# Patient Record
Sex: FEMALE | Race: WHITE | NOT HISPANIC OR LATINO | Employment: FULL TIME | URBAN - NONMETROPOLITAN AREA
[De-identification: names, ages, dates, MRNs, and addresses within clinical notes are randomized per-mention and may not be internally consistent; named-entity substitution may affect disease eponyms.]

---
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
## 2020-01-06 ENCOUNTER — HOSPITAL ENCOUNTER (EMERGENCY)
Facility: CLINIC | Age: 20
Discharge: HOME | End: 2020-01-06
Attending: EMERGENCY MEDICINE | Admitting: EMERGENCY MEDICINE
Payer: COMMERCIAL

## 2020-01-06 VITALS
WEIGHT: 120 LBS | BODY MASS INDEX: 20.49 KG/M2 | HEIGHT: 64 IN | RESPIRATION RATE: 16 BRPM | TEMPERATURE: 97.8 F | HEART RATE: 81 BPM | DIASTOLIC BLOOD PRESSURE: 70 MMHG | SYSTOLIC BLOOD PRESSURE: 145 MMHG | OXYGEN SATURATION: 99 %

## 2020-01-06 DIAGNOSIS — S00.412A EAR CANAL ABRASION, LEFT, INITIAL ENCOUNTER: ICD-10-CM

## 2020-01-06 PROCEDURE — 99284 EMERGENCY DEPT VISIT MOD MDM: CPT | Mod: Z6 | Performed by: EMERGENCY MEDICINE

## 2020-01-06 PROCEDURE — 99283 EMERGENCY DEPT VISIT LOW MDM: CPT | Performed by: EMERGENCY MEDICINE

## 2020-01-06 RX ORDER — NEOMYCIN SULFATE, POLYMYXIN B SULFATE AND HYDROCORTISONE 10; 3.5; 1 MG/ML; MG/ML; [USP'U]/ML
4 SUSPENSION/ DROPS AURICULAR (OTIC) 4 TIMES DAILY
Qty: 6 ML | Refills: 0 | Status: SHIPPED | OUTPATIENT
Start: 2020-01-06 | End: 2020-01-13

## 2020-01-06 RX ORDER — TRANEXAMIC ACID 100 MG/ML
500 INJECTION, SOLUTION INTRAVENOUS ONCE
Status: DISCONTINUED | OUTPATIENT
Start: 2020-01-06 | End: 2020-01-06 | Stop reason: HOSPADM

## 2020-01-06 ASSESSMENT — MIFFLIN-ST. JEOR: SCORE: 1304.32

## 2020-01-06 NOTE — ED AVS SNAPSHOT
[facility] Emergency Department  [address] [place] [address]  Phone:  [phone]  Fax:  [phone]                                    Vibha Caceres   MRN: [id number]    Department:  [facility] Emergency Department   Date of Visit:  1/6/2020           After Visit Summary Signature Page    I have received my discharge instructions, and my questions have been answered. I have discussed any challenges I see with this plan with the nurse or doctor.    ..........................................................................................................................................  Patient/Patient Representative Signature      ..........................................................................................................................................  Patient Representative Print Name and Relationship to Patient    ..................................................               ................................................  Date                                   Time    ..........................................................................................................................................  Reviewed by Signature/Title    ...................................................              ..............................................  Date                                               Time          22EPIC Rev 08/18

## 2020-01-07 DIAGNOSIS — J45.40 MODERATE PERSISTENT ASTHMA, UNCOMPLICATED: ICD-10-CM

## 2020-01-07 NOTE — TELEPHONE ENCOUNTER
Requested Prescriptions   Pending Prescriptions Disp Refills     predniSONE (DELTASONE) 20 MG tablet [Pharmacy Med Name: PREDNISONE 20MG TABS] 10 tablet 3     Sig: TAKE ONE TABLET BY MOUTH TWICE A DAY       There is no refill protocol information for this order        Last Written Prescription Date:  8/15/2018  Last Fill Quantity: 10 tablets,  # refills: 3   Last office visit: 5/31/2019 with prescribing provider:     Future Office Visit:    Moderate persistent asthma, uncomplicated [J45.40]     Routing refill request to provider for review/approval because:  Drug not on the [facility] refill protocol     Nelsy Magana RN

## 2020-01-07 NOTE — DISCHARGE INSTRUCTIONS
Avoid Q-tips in your ears as that can cause further injury.  You have middle ear effusions and may benefit from your prednisone.  The middle ears are not infected so antibiotics would not benefit.  Regarding your ear canal if you develop colored drainage, increased pain, fever you should begin antibiotic drops and follow-up to have your ear rechecked.

## 2020-01-07 NOTE — ED PROVIDER NOTES
History     Chief Complaint   Patient presents with     Otalgia     HPI  Vibha Caceres is a 19 year old female who presents with bright red blood coming from her left ear.  Earlier this evening patient had irritation at the ear so tried to relieve this with a Q-tip.  She was unsuccessful so had her mother attempt this also.  Unfortunately while she was doing this she developed bright red blood from the left ear canal.  This has persisted.  Denies any pain with this.  Denies any right ear pain or drainage.  No history of mastoid issue.  She is not had fever or chills.  Denies any congestion or sore throat.  Is not on blood thinners.  Has seasonal allergies and has been sneezing more.  Prior to manipulating the ear with a Q-tip she had no discharge or drainage.    Allergies:  Allergies   Allergen Reactions     Amoxicillin-Pot Clavulanate Hives     Cat Hair [Cats]      Dog Hair [Dogs]      Dust Mites      Milk [Lactose]      Milk - is able to eat cheese and ice cream and baked goods with milk, just not able to drink milk Per Dr. Carballo, Pediatric Pulmonary and infectious disease at [facility]     Mold      Trees      Amoxicillin Rash     Cefprozil Rash       Problem List:    Patient Active Problem List    Diagnosis Date Noted     Seasonal allergic rhinitis 07/13/2012     Priority: Medium     Scoliosis 07/22/2011     Priority: Medium     Asthma, mild intermittent, well-controlled      Priority: Medium        Past Medical History:    Past Medical History:   Diagnosis Date     Acute sinusitis, unspecified      Asthma, moderate persistent      Pneumonia, organism unspecified(486) 12/2005     Unspecified asthma, with exacerbation 09/20/2007       Past Surgical History:    History reviewed. No pertinent surgical history.    Family History:    Family History   Problem Relation Age of Onset     Gynecology Mother         ovarian hyperplasia     Asthma Father      Allergies Father      Prostate Cancer Maternal Grandfather       "Thyroid Disease Maternal Grandfather      Thyroid Disease Paternal Grandmother         hypo       Social History:  Marital Status:  Single [1]  Social History     Tobacco Use     Smoking status: Never Smoker     Smokeless tobacco: Never Used   Substance Use Topics     Alcohol use: No     Drug use: No        Medications:    neomycin-polymyxin-hydrocortisone (CORTISPORIN) 3.5-09551-3 otic suspension  albuterol (VENTOLIN HFA) 108 (90 Base) MCG/ACT Inhaler  Calcium Carb-Cholecalciferol (CALCIUM 500 + D) 500-400 MG-UNIT TABS  cetirizine (ZYRTEC) 10 MG tablet  fluticasone (FLOVENT HFA) 220 MCG/ACT Inhaler  montelukast (SINGULAIR) 10 MG tablet  predniSONE (DELTASONE) 20 MG tablet  triamcinolone (KENALOG) 0.1 % cream          Review of Systems all other systems are reviewed and are negative.    Physical Exam   BP: (!) 145/70  Pulse: 81  Resp: 16  Height: 162.6 cm (5' 4\")  Weight: 54.4 kg (120 lb)  SpO2: 99 %      Physical Exam general alert cooperative female who does not toxic or ill.  HEENT shows no facial swelling or asymmetry.  The right canal is normal.  The TM shows an effusion but no infection.  On the left side she has bright red blood oozing from the inferior aspect of the external canal.  The TM again shows an effusion but no infection.  The TM is intact.  She has no mastoid tenderness or redness.  Nasal congestion but no oral lesions.  Neck is supple.    ED Course        Procedures               Critical Care time:  none               No results found for this or any previous visit (from the past 24 hour(s)).    Medications   tranexamic acid (CYKLOKAPRON) spray 500 mg (has no administration in time range)     TXA is placed in the canal on a pledget.  On recheck the bleeding had stopped.  The area of abrasion was then noted.  No suturable lesion was noted.  Assessments & Plan (with Medical Decision Making)   Vibha Caceres is a 19 year old female who presents with bright red blood coming from her left ear.  " Earlier this evening patient had irritation at the ear so tried to relieve this with a Q-tip.  She was unsuccessful so had her mother attempt this also.  Unfortunately while she was doing this she developed bright red blood from the left ear canal.  This has persisted.  Denies any pain with this.  Denies any right ear pain or drainage.  No history of mastoid issue.  She is not had fever or chills.  Denies any congestion or sore throat.  Is not on blood thinners.  Has seasonal allergies and has been sneezing more.  Prior to manipulating the ear with a Q-tip she had no discharge or drainage.  On exam patient had bilateral ear effusions but no middle ear infection.  She had bright red blood oozing from the inferior external aspect of the canal.  No mastoid tenderness or redness.  TXA was placed on a pledget with adequate hemostasis.  Information on ear abrasion is provided.  Recommended no further use of Q-tips.  Does have bilateral ear effusions but no infection of the middle ear.  She has prednisone for her allergies and can take this for that.  I did give her a prescription for eardrops if she develops ear drainage, fever, or increased pain.  However if this occurs have asked her to see a provider at school if this occurs.  Reasons to return to emergency room were discussed.  I have reviewed the nursing notes.    I have reviewed the findings, diagnosis, plan and need for follow up with the patient.       New Prescriptions    NEOMYCIN-POLYMYXIN-HYDROCORTISONE (CORTISPORIN) 3.5-78593-4 OTIC SUSPENSION    Place 4 drops Into the left ear 4 times daily for 7 days       Final diagnoses:   Ear canal abrasion, left, initial encounter       1/6/2020   [facility] EMERGENCY DEPARTMENT     Nghia Hurley MD  01/06/20 1919       Nghia Hurley MD  01/06/20 1925

## 2020-01-08 RX ORDER — PREDNISONE 20 MG/1
TABLET ORAL
Qty: 10 TABLET | Refills: 3 | Status: SHIPPED | OUTPATIENT
Start: 2020-01-08 | End: 2021-06-15

## 2020-06-22 DIAGNOSIS — J45.40 MODERATE PERSISTENT ASTHMA, UNCOMPLICATED: ICD-10-CM

## 2020-06-23 NOTE — TELEPHONE ENCOUNTER
Routing to schedulers to set up virtual/phone appointment for patient for asthma recheck.  Please also ask patient how much medication she has left and schedule appropriately.   If patient needs a refill before their appointment, please route to Rn for completion of refill.  Upon routing message, write WHEN appointment is scheduled and if they have enough medication to last to appointment.  Thank you!

## 2020-06-23 NOTE — TELEPHONE ENCOUNTER
Patient states she doesn't need visit, just a refill. Please call Patient back to explain if a visit is really necessary.

## 2020-06-24 NOTE — TELEPHONE ENCOUNTER
Please do ACT over the phone (she has a paper copy).  Then I will send over refills.     Electronically signed by Cassie Santiago CNP.

## 2020-06-26 RX ORDER — MONTELUKAST SODIUM 10 MG/1
TABLET ORAL
Qty: 90 TABLET | Refills: 3 | Status: SHIPPED | OUTPATIENT
Start: 2020-06-26 | End: 2021-06-14

## 2020-06-26 RX ORDER — ALBUTEROL SULFATE 90 UG/1
AEROSOL, METERED RESPIRATORY (INHALATION)
Qty: 36 G | Refills: 4 | Status: SHIPPED | OUTPATIENT
Start: 2020-06-26 | End: 2020-07-13

## 2020-06-27 ASSESSMENT — ASTHMA QUESTIONNAIRES: ACT_TOTALSCORE: 24

## 2020-07-03 DIAGNOSIS — L30.9 ECZEMA, UNSPECIFIED TYPE: ICD-10-CM

## 2020-07-03 RX ORDER — TRIAMCINOLONE ACETONIDE 1 MG/G
CREAM TOPICAL
Qty: 80 G | Refills: 0 | Status: CANCELLED | OUTPATIENT
Start: 2020-07-03

## 2020-07-06 NOTE — TELEPHONE ENCOUNTER
Routing to schedulers to set up F2F appointment for patient for physical.  Please also ask patient how much medication she has left and schedule appropriately.   If patient needs a refill before their appointment, please route to triage for completion of refill.  Upon routing message, write WHEN appointment is scheduled and if they have enough medication to last to appointment.  Thank you!    Last Written Prescription Date:  6/25/18  Last Fill Quantity: 80 g,  # refills: 0   Last office visit: 5/31/2019 with prescribing provider:     Future Office Visit:      RN to fill to get to appointment if refill needed.  Natasha Whaley, BSN, RN

## 2020-07-07 NOTE — TELEPHONE ENCOUNTER
Vibha is scheduled for 7/13 for a phone visit. Said no to a physical. Will have enough medication until this visit.

## 2020-07-13 ENCOUNTER — VIRTUAL VISIT (OUTPATIENT)
Dept: FAMILY MEDICINE | Facility: CLINIC | Age: 20
End: 2020-07-13
Payer: COMMERCIAL

## 2020-07-13 DIAGNOSIS — J45.40 MODERATE PERSISTENT ASTHMA, UNCOMPLICATED: Primary | ICD-10-CM

## 2020-07-13 DIAGNOSIS — L30.9 ECZEMA, UNSPECIFIED TYPE: ICD-10-CM

## 2020-07-13 PROCEDURE — 99214 OFFICE O/P EST MOD 30 MIN: CPT | Mod: TEL | Performed by: NURSE PRACTITIONER

## 2020-07-13 RX ORDER — ALBUTEROL SULFATE 90 UG/1
AEROSOL, METERED RESPIRATORY (INHALATION)
Qty: 36 G | Refills: 4 | Status: SHIPPED | OUTPATIENT
Start: 2020-07-13 | End: 2021-06-14

## 2020-07-13 RX ORDER — TRIAMCINOLONE ACETONIDE 1 MG/G
CREAM TOPICAL
Qty: 80 G | Refills: 0 | Status: SHIPPED | OUTPATIENT
Start: 2020-07-13 | End: 2021-07-09

## 2020-07-13 NOTE — PATIENT INSTRUCTIONS
Follow up for a physical when you can.  Okay to defer for a little while if you want.     Medications were refilled.

## 2020-07-13 NOTE — PROGRESS NOTES
"Vibha Caceres is a 20 year old female who is being evaluated via a billable telephone visit.      The patient has been notified of following:     \"This telephone visit will be conducted via a call between you and your physician/provider. We have found that certain health care needs can be provided without the need for a physical exam.  This service lets us provide the care you need with a short phone conversation.  If a prescription is necessary we can send it directly to your pharmacy.  If lab work is needed we can place an order for that and you can then stop by our lab to have the test done at a later time.    Telephone visits are billed at different rates depending on your insurance coverage. During this emergency period, for some insurers they may be billed the same as an in-person visit.  Please reach out to your insurance provider with any questions.    If during the course of the call the physician/provider feels a telephone visit is not appropriate, you will not be charged for this service.\"    Patient has given verbal consent for Telephone visit?  Yes    What phone number would you like to be contacted at? [phone]    How would you like to obtain your AVS? Mail a copy    Subjective     Vibha Caceres is a 20 year old female who presents via phone visit today for the following health issues:    HPI  Medication Followup of triamcinolone    Taking Medication as prescribed: yes    Side Effects:  None    Medication Helping Symptoms:  yes      Taking for eczema.  Works well.  Also needs a refill of her Albuterol inhaler.  Using rarely.  Symptoms during allergy season.  Not using her Flovent or Singulair consistently.  Does not need a refill of those.          Reviewed and updated as needed this visit by Provider         Review of Systems   Constitutional, HEENT, cardiovascular, pulmonary, gi and gu systems are negative, except as otherwise noted.       Objective   Reported vitals:  There were no vitals " taken for this visit.   healthy, alert and no distress  PSYCH: Alert and oriented times 3; coherent speech, normal   rate and volume, able to articulate logical thoughts, able   to abstract reason, no tangential thoughts, no hallucinations   or delusions  Her affect is normal  RESP: No cough, no audible wheezing, able to talk in full sentences  Remainder of exam unable to be completed due to telephone visits    Diagnostic Test Results:  none         Assessment/Plan:  1. Moderate persistent asthma, uncomplicated  Chronic, controlled.  No change in treatment plan.   - albuterol (VENTOLIN HFA) 108 (90 Base) MCG/ACT inhaler; INHALE 2 PUFFS INTO THE LUNGS EVERY 6 HOURS AS NEEDED  Dispense: 36 g; Refill: 4    2. Eczema, unspecified type  Chronic, controlled.  No change in treatment plan.   - triamcinolone (KENALOG) 0.1 % external cream; Apply sparingly to affected area three times daily as needed  Dispense: 80 g; Refill: 0    No follow-ups on file.      Phone call duration:  8 minutes    FRANDY Connelly CNP

## 2020-10-22 DIAGNOSIS — J45.40 MODERATE PERSISTENT ASTHMA, UNCOMPLICATED: ICD-10-CM

## 2020-10-23 RX ORDER — FLUTICASONE PROPIONATE 220 UG/1
2 AEROSOL, METERED RESPIRATORY (INHALATION) 2 TIMES DAILY
Qty: 1 INHALER | Refills: 2 | Status: SHIPPED | OUTPATIENT
Start: 2020-10-23 | End: 2021-06-14

## 2021-06-14 ENCOUNTER — TELEPHONE (OUTPATIENT)
Dept: FAMILY MEDICINE | Facility: CLINIC | Age: 21
End: 2021-06-14

## 2021-07-09 ENCOUNTER — OFFICE VISIT (OUTPATIENT)
Dept: FAMILY MEDICINE | Facility: CLINIC | Age: 21
End: 2021-07-09
Payer: COMMERCIAL

## 2021-07-09 VITALS
RESPIRATION RATE: 10 BRPM | WEIGHT: 139.4 LBS | BODY MASS INDEX: 23.93 KG/M2 | SYSTOLIC BLOOD PRESSURE: 98 MMHG | OXYGEN SATURATION: 99 % | DIASTOLIC BLOOD PRESSURE: 60 MMHG | HEART RATE: 99 BPM | TEMPERATURE: 97.8 F

## 2021-07-09 DIAGNOSIS — L30.9 ECZEMA, UNSPECIFIED TYPE: ICD-10-CM

## 2021-07-09 DIAGNOSIS — J30.1 SEASONAL ALLERGIC RHINITIS DUE TO POLLEN: ICD-10-CM

## 2021-07-09 DIAGNOSIS — J45.40 MODERATE PERSISTENT ASTHMA, UNCOMPLICATED: Primary | ICD-10-CM

## 2021-07-09 PROCEDURE — 99214 OFFICE O/P EST MOD 30 MIN: CPT | Performed by: NURSE PRACTITIONER

## 2021-07-09 RX ORDER — TRIAMCINOLONE ACETONIDE 1 MG/G
CREAM TOPICAL
Qty: 80 G | Refills: 0 | Status: SHIPPED | OUTPATIENT
Start: 2021-07-09

## 2021-07-09 NOTE — LETTER
My Asthma Action Plan    Name: Vibha Caceres   YOB: 2000  Date: 7/9/2021   My doctor: Maria Eugenia Wagner NP   My clinic: [facility]        My Rescue Medicine:   Albuterol inhaler (Proair/Ventolin/Proventil HFA)  2-4 puffs EVERY 4 HOURS as needed. Use a spacer if recommended by your provider.   My Asthma Severity:   Intermittent / Exercise Induced  Know your asthma triggers: upper respiratory infections and pollens  smoke  dust mites  pollens  animal dander  mold  humidity  strong odors and fumes  exercise or sports          GREEN ZONE   Good Control    I feel good    No cough or wheeze    Can work, sleep and play without asthma symptoms       Take your asthma control medicine every day.     1. If exercise triggers your asthma, take your rescue medication    15 minutes before exercise or sports, and    During exercise if you have asthma symptoms  2. Spacer to use with inhaler: If you have a spacer, make sure to use it with your inhaler             YELLOW ZONE Getting Worse  I have ANY of these:    I do not feel good    Cough or wheeze    Chest feels tight    Wake up at night   1. Keep taking your Green Zone medications  2. Start taking your rescue medicine:    every 20 minutes for up to 1 hour. Then every 4 hours for 24-48 hours.  3. If you stay in the Yellow Zone for more than 12-24 hours, contact your doctor.  4. If you do not return to the Green Zone in 12-24 hours or you get worse, start taking your oral steroid medicine if prescribed by your provider.           RED ZONE Medical Alert - Get Help  I have ANY of these:    I feel awful    Medicine is not helping    Breathing getting harder    Trouble walking or talking    Nose opens wide to breathe       1. Take your rescue medicine NOW  2. If your provider has prescribed an oral steroid medicine, start taking it NOW  3. Call your doctor NOW  4. If you are still in the Red Zone after 20 minutes and you have not reached your  doctor:    Take your rescue medicine again and    Call 911 or go to the emergency room right away    See your regular doctor within 2 weeks of an Emergency Room or Urgent Care visit for follow-up treatment.          Annual Reminders:  Meet with Asthma Educator,  Flu Shot in the Fall, consider Pneumonia Vaccination for patients with asthma (aged 19 and older).    Pharmacy: [facility] [address]    Electronically signed by Maria Eugenia Wagner NP   Date: 07/09/21                    Asthma Triggers  How To Control Things That Make Your Asthma Worse    Triggers are things that make your asthma worse.  Look at the list below to help you find your triggers and   what you can do about them. You can help prevent asthma flare-ups by staying away from your triggers.      Trigger                                                          What you can do   Cigarette Smoke  Tobacco smoke can make asthma worse. Do not allow smoking in your home, car or around you.  Be sure no one smokes at a child s day care or school.  If you smoke, ask your health care provider for ways to help you quit.  Ask family members to quit too.  Ask your health care provider for a referral to Quit Plan to help you quit smoking, or call 4-687-182-PLAN.     Colds, Flu, Bronchitis  These are common triggers of asthma. Wash your hands often.  Don t touch your eyes, nose or mouth.  Get a flu shot every year.     Dust Mites  These are tiny bugs that live in cloth or carpet. They are too small to see. Wash sheets and blankets in hot water every week.   Encase pillows and mattress in dust mite proof covers.  Avoid having carpet if you can. If you have carpet, vacuum weekly.   Use a dust mask and HEPA vacuum.   Pollen and Outdoor Mold  Some people are allergic to trees, grass, or weed pollen, or molds. Try to keep your windows closed.  Limit time out doors when pollen count is high.   Ask you health care provider about taking  medicine during allergy season.     Animal Dander  Some people are allergic to skin flakes, urine or saliva from pets with fur or feathers. Keep pets with fur or feathers out of your home.    If you can t keep the pet outdoors, then keep the pet out of your bedroom.  Keep the bedroom door closed.  Keep pets off cloth furniture and away from stuffed toys.     Mice, Rats, and Cockroaches  Some people are allergic to the waste from these pests.   Cover food and garbage.  Clean up spills and food crumbs.  Store grease in the refrigerator.   Keep food out of the bedroom.   Indoor Mold  This can be a trigger if your home has high moisture. Fix leaking faucets, pipes, or other sources of water.   Clean moldy surfaces.  Dehumidify basement if it is damp and smelly.   Smoke, Strong Odors, and Sprays  These can reduce air quality. Stay away from strong odors and sprays, such as perfume, powder, hair spray, paints, smoke incense, paint, cleaning products, candles and new carpet.   Exercise or Sports  Some people with asthma have this trigger. Be active!  Ask your doctor about taking medicine before sports or exercise to prevent symptoms.    Warm up for 5-10 minutes before and after sports or exercise.     Other Triggers of Asthma  Cold air:  Cover your nose and mouth with a scarf.  Sometimes laughing or crying can be a trigger.  Some medicines and food can trigger asthma.

## 2021-07-09 NOTE — PROGRESS NOTES
Assessment & Plan       Moderate persistent asthma, uncomplicated  Uses albuterol inhaler as directed. States that asthma is worse in the spring/summer time frame with more pollen in the air. Denies any current chest pains or difficulty breathing. Uses singulair and ICS with colds/ flares.  Non smoker. ACT is 21, AAP completed.   - Asthma action plan updated  - continue to use inhaler as prescribed    Eczema, unspecified type  Currently controlled with use of cream. Medication refilled at this time. Denies worsening of eczema.   - triamcinolone (KENALOG) 0.1 % external cream; Apply sparingly to affected area three times daily as needed    Seasonal allergic rhinitis due to pollen  PRN use of montelukast tablets and fluticasone inhaler with increased allergy symptoms. Well controlled. Denies any current shortness of breath or difficulty breathing   - continue medications as directed, when needed for symptomatic management     Health maintenance:  Addressed questions regarding annual physical, blood work and pap smear. Denies any concerns regarding STD/STI infections. Education provided regarding cholesterol screenings and annual visit for next summer.     35 minutes spent on the date of the encounter doing chart review, review of test results, patient visit and documentation        FUTURE APPOINTMENTS:      Return in about 3 months (around 10/9/2021) for Physical Exam.    Tracy Robles, Student Nurse Practitioner    Maria Eugenia Wagner NP  [facility]    Jaylen Dunne is a 21 year old who presents for the following health issues   Requesting refill of triamcinolone cream for eczema rash. Currently home for the summer from college. Denies any current health complaints or concerns. Addressed questions regarding annual physical, blood work and pap smear. Denies any concerns regarding STD/STI infections. Education provided regarding cholesterol screenings and annual visit for next summer.      HPI     Asthma Follow-Up    Was ACT completed today?    Yes    ACT Total Scores 7/9/2021   ACT TOTAL SCORE -   ASTHMA ER VISITS -   ASTHMA HOSPITALIZATIONS -   ACT TOTAL SCORE (Goal Greater than or Equal to 20) 21   In the past 12 months, how many times did you visit the emergency room for your asthma without being admitted to the hospital? 0   In the past 12 months, how many times were you hospitalized overnight because of your asthma? 0          How many days per week do you miss taking your asthma controller medication?  I do not have an asthma controller medication    Please describe any recent triggers for your asthma: smoke, dust mites, pollens, animal dander, mold, humidity, strong odors and fumes and exercise or sports    Have you had any Emergency Room Visits, Urgent Care Visits, or Hospital Admissions since your last office visit?  No      How many servings of fruits and vegetables do you eat daily?  0-1    On average, how many sweetened beverages do you drink each day (Examples: soda, juice, sweet tea, etc.  Do NOT count diet or artificially sweetened beverages)?   0    How many days per week do you exercise enough to make your heart beat faster? 3 or less    How many minutes a day do you exercise enough to make your heart beat faster? 20 - 29    How many days per week do you miss taking your medication? 0    Medication Followup of Triamcinolone     Taking Medication as prescribed: yes    Side Effects:  None    Medication Helping Symptoms:  yes         Review of Systems   CONSTITUTIONAL:NEGATIVE for fever, chills, change in weight  INTEGUMENTARY/SKIN: NEGATIVE for worrisome rashes, moles or lesions  EYES: NEGATIVE for vision changes or irritation  ENT/MOUTH: NEGATIVE for ear, mouth and throat problems  RESP:NEGATIVE for significant cough or SOB  CV: NEGATIVE for chest pain, palpitations or peripheral edema  : normal menstrual cycles, negative for, dysuria and urgency  PSYCHIATRIC: NEGATIVE for  changes in mood or affect      Objective    BP 98/60   Pulse 99   Temp 97.8  F (36.6  C)   Resp 10   Wt 63.2 kg (139 lb 6.4 oz)   SpO2 99%   BMI 23.93 kg/m    Body mass index is 23.93 kg/m .  Physical Exam   GENERAL: healthy, alert and no distress  EYES: Eyes grossly normal to inspection, PERRL and conjunctivae and sclerae normal  HENT: ear canals and TM's normal, nose and mouth without ulcers or lesions  NECK: no adenopathy, no asymmetry, masses, or scars and thyroid normal to palpation  RESP: lungs clear to auscultation - no rales, rhonchi or wheezes  CV: regular rate and rhythm, normal S1 S2, no S3 or S4, no murmur, click or rub, no peripheral edema and peripheral pulses strong  ABDOMEN: soft, nontender, no hepatosplenomegaly, no masses and bowel sounds normal  MS: no gross musculoskeletal defects noted, no edema  PSYCH: mentation appears normal, affect normal/bright  LYMPH: no cervical, supraclavicular, axillary, or inguinal adenopathy

## 2021-07-10 ASSESSMENT — ASTHMA QUESTIONNAIRES: ACT_TOTALSCORE: 21

## 2022-08-03 NOTE — PROGRESS NOTES
SUBJECTIVE:   CC: Vibha Caceres is an 18 year old woman who presents for preventive health visit.     Physical   Annual:     Getting at least 3 servings of Calcium per day::  NO    Bi-annual eye exam::  Yes    Dental care twice a year::  Yes    Sleep apnea or symptoms of sleep apnea::  None    Diet::  Regular (no restrictions)    Frequency of exercise::  2-3 days/week    Duration of exercise::  N/A    Taking medications regularly::  Yes    Medication side effects::  None    Additional concerns today::  No                 Asthma Follow-Up    Was ACT completed today?    Yes    ACT Total Scores 6/16/2017   ACT TOTAL SCORE -   ASTHMA ER VISITS -   ASTHMA HOSPITALIZATIONS -   ACT TOTAL SCORE (Goal Greater than or Equal to 20) 21   In the past 12 months, how many times did you visit the emergency room for your asthma without being admitted to the hospital? 0   In the past 12 months, how many times were you hospitalized overnight because of your asthma? 0       Recent asthma triggers that patient is dealing with: None currently     Needs refill of Triamcinolone for her eczema.     Today's PHQ-2 Score:   PHQ-2 ( 1999 Pfizer) 6/25/2018   Q1: Little interest or pleasure in doing things 0   Q2: Feeling down, depressed or hopeless 0   PHQ-2 Score 0   Q1: Little interest or pleasure in doing things Not at all   Q2: Feeling down, depressed or hopeless Not at all   PHQ-2 Score 0       Abuse: Current or Past(Physical, Sexual or Emotional)- No  Do you feel safe in your environment - Yes    Social History   Substance Use Topics     Smoking status: Never Smoker     Smokeless tobacco: Never Used     Alcohol use No     Alcohol Use 6/25/2018   If you drink alcohol do you typically have greater than 3 drinks per day OR greater than 7 drinks per week? Not Applicable   No flowsheet data found.    Reviewed orders with patient.  Reviewed health maintenance and updated orders accordingly - Yes  Labs reviewed in EPIC  BP Readings from Last  Addended by: JOSE APONTE on: 8/3/2022 01:12 PM     Modules accepted: Orders     3 Encounters:   06/25/18 108/64   06/03/16 92/60   06/05/15 94/60    Wt Readings from Last 3 Encounters:   06/25/18 123 lb (55.8 kg) (48 %)*   08/25/17 126 lb (57.2 kg) (58 %)*   07/19/16 111 lb (50.3 kg) (33 %)*     * Growth percentiles are based on [facility] 2-20 Years data.                  Patient Active Problem List   Diagnosis     Asthma, mild intermittent, well-controlled     Scoliosis     Seasonal allergic rhinitis     History reviewed. No pertinent surgical history.    Social History   Substance Use Topics     Smoking status: Never Smoker     Smokeless tobacco: Never Used     Alcohol use No     Family History   Problem Relation Age of Onset     Gynecology Mother      ovarian hyperplasia     Asthma Father      Allergies Father      Prostate Cancer Maternal Grandfather      Thyroid Disease Maternal Grandfather      Thyroid Disease Paternal Grandmother      hypo         Allergies   Allergen Reactions     Amoxicillin-Pot Clavulanate Hives     Cat Hair [Cats]      Dog Hair [Dogs]      Dust Mites      Milk [Lactose]      Milk - is able to eat cheese and ice cream and baked goods with milk, just not able to drink milk Per Dr. Carballo, Pediatric Pulmonary and infectious disease at [facility]     Mold      Trees      Amoxicillin Rash     Cefprozil Rash       Mammogram not appropriate for this patient based on age.    Pertinent mammograms are reviewed under the imaging tab.  History of abnormal Pap smear: NO - under age 21, PAP not appropriate for age     Reviewed and updated as needed this visit by clinical staff  Tobacco  Allergies  Med Hx  Surg Hx  Fam Hx  Soc Hx        Reviewed and updated as needed this visit by Provider        Past Medical History:   Diagnosis Date     Acute sinusitis, unspecified     Multiple episodes     Asthma, moderate persistent      Pneumonia, organism unspecified(486) 12/2005     Unspecified asthma, with exacerbation 09/20/2007    Respiratory failure, asthma exacerbation, improving viral  "illness.      History reviewed. No pertinent surgical history.    Review of Systems  CONSTITUTIONAL: NEGATIVE for fever, chills, change in weight  INTEGUMENTARU/SKIN: NEGATIVE for worrisome rashes, moles or lesions  EYES: NEGATIVE for vision changes or irritation  ENT: NEGATIVE for ear, mouth and throat problems  RESP: NEGATIVE for significant cough or SOB  BREAST: NEGATIVE for masses, tenderness or discharge  CV: NEGATIVE for chest pain, palpitations or peripheral edema  GI: NEGATIVE for nausea, abdominal pain, heartburn, or change in bowel habits  : NEGATIVE for unusual urinary or vaginal symptoms. Periods are regular.  MUSCULOSKELETAL: NEGATIVE for significant arthralgias or myalgia  NEURO: NEGATIVE for weakness, dizziness or paresthesias  PSYCHIATRIC: NEGATIVE for changes in mood or affect     OBJECTIVE:   /64  Pulse 100  Temp 98  F (36.7  C) (Tympanic)  Resp 16  Ht 5' 4\" (1.626 m)  Wt 123 lb (55.8 kg)  LMP 06/18/2018  SpO2 100%  Breastfeeding? No  BMI 21.11 kg/m2  Physical Exam  GENERAL: healthy, alert and no distress  EYES: Eyes grossly normal to inspection, PERRL and conjunctivae and sclerae normal  HENT: ear canals and TM's normal, nose and mouth without ulcers or lesions  NECK: no adenopathy, no asymmetry, masses, or scars and thyroid normal to palpation  RESP: lungs clear to auscultation - no rales, rhonchi or wheezes  CV: regular rate and rhythm, normal S1 S2, no S3 or S4, no murmur, click or rub, no peripheral edema and peripheral pulses strong  ABDOMEN: soft, nontender, no hepatosplenomegaly, no masses and bowel sounds normal  MS: no gross musculoskeletal defects noted, no edema  SKIN: no suspicious lesions or rashes  NEURO: Normal strength and tone, mentation intact and speech normal  PSYCH: mentation appears normal, affect normal/bright    Diagnostic Test Results:  none     ASSESSMENT/PLAN:   1. Routine general medical examination at a health care facility    2. Moderate persistent " "asthma, uncomplicated  Chronic, controlled.  No change in treatment plan.  Has been very stable.  Last saw Pulmonology last year, no changes.  I advised I could continue to fill her medications as needed.  She is going away to college in [place] this fall and may not be able to get back to the clinic as often.   - albuterol (VENTOLIN HFA) 108 (90 Base) MCG/ACT Inhaler; Inhale 2 puffs into the lungs every 6 hours as needed  Dispense: 2 Inhaler; Refill: 4  - fluticasone (FLOVENT HFA) 220 MCG/ACT Inhaler; Inhale 2 puffs into the lungs 2 times daily  Dispense: 1 Inhaler; Refill: 3  - montelukast (SINGULAIR) 10 MG tablet; Take 1 tablet (10 mg) by mouth At Bedtime  Dispense: 90 tablet; Refill: 3     Eczema, unspecified type  Chronic, controlled.  No change in treatment plan.   - triamcinolone (KENALOG) 0.1 % cream; Apply sparingly to affected area three times daily as needed  Dispense: 80 g; Refill: 0     Need for vaccination  - HUMAN PAPILLOMA VIRUS (GARDASIL 9) VACCINE [73924]  - MENINGOCOCCAL VACCINE,IM (MENACTRA) [68312] AGE 11-55  - 1st  Administration  [48710]  - Each additional admin.  (Right click and add QUANTITY)  [[id number]]    COUNSELING:  Reviewed preventive health counseling, as reflected in patient instructions       Regular exercise       Healthy diet/nutrition       Vision screening       Contraception       Family planning    BP Readings from Last 1 Encounters:   06/25/18 108/64     Estimated body mass index is 21.11 kg/(m^2) as calculated from the following:    Height as of this encounter: 5' 4\" (1.626 m).    Weight as of this encounter: 123 lb (55.8 kg).           reports that she has never smoked. She has never used smokeless tobacco.      Counseling Resources:  ATP IV Guidelines  Pooled Cohorts Equation Calculator  Breast Cancer Risk Calculator  FRAX Risk Assessment  ICSI Preventive Guidelines  Dietary Guidelines for Americans, 2010  USDA's MyPlate  ASA Prophylaxis  Lung CA Screening    Cassie Santiago, " APRN CNP  [facility]

## 2022-08-04 ENCOUNTER — OFFICE VISIT (OUTPATIENT)
Dept: FAMILY MEDICINE | Facility: CLINIC | Age: 22
End: 2022-08-04
Payer: COMMERCIAL

## 2022-08-04 VITALS
DIASTOLIC BLOOD PRESSURE: 60 MMHG | WEIGHT: 144 LBS | SYSTOLIC BLOOD PRESSURE: 102 MMHG | HEART RATE: 96 BPM | TEMPERATURE: 97.8 F | OXYGEN SATURATION: 96 % | HEIGHT: 65 IN | BODY MASS INDEX: 23.99 KG/M2

## 2022-08-04 DIAGNOSIS — G47.00 PERSISTENT INSOMNIA: ICD-10-CM

## 2022-08-04 DIAGNOSIS — J45.20 ASTHMA, MILD INTERMITTENT, WELL-CONTROLLED: ICD-10-CM

## 2022-08-04 DIAGNOSIS — Z12.4 CERVICAL CANCER SCREENING: ICD-10-CM

## 2022-08-04 DIAGNOSIS — Z00.00 ROUTINE GENERAL MEDICAL EXAMINATION AT A HEALTH CARE FACILITY: Primary | ICD-10-CM

## 2022-08-04 PROCEDURE — 99395 PREV VISIT EST AGE 18-39: CPT | Performed by: NURSE PRACTITIONER

## 2022-08-04 PROCEDURE — 99213 OFFICE O/P EST LOW 20 MIN: CPT | Mod: 25 | Performed by: NURSE PRACTITIONER

## 2022-08-04 RX ORDER — HYDROXYZINE HYDROCHLORIDE 25 MG/1
25-50 TABLET, FILM COATED ORAL
Qty: 30 TABLET | Refills: 1 | Status: SHIPPED | OUTPATIENT
Start: 2022-08-04 | End: 2022-09-03

## 2022-08-04 RX ORDER — FLUTICASONE PROPIONATE 220 UG/1
2 AEROSOL, METERED RESPIRATORY (INHALATION) 2 TIMES DAILY
Qty: 12 G | Refills: 3 | Status: SHIPPED | OUTPATIENT
Start: 2022-08-04 | End: 2023-08-07

## 2022-08-04 RX ORDER — ALBUTEROL SULFATE 90 UG/1
AEROSOL, METERED RESPIRATORY (INHALATION)
Qty: 36 G | Refills: 4 | Status: SHIPPED | OUTPATIENT
Start: 2022-08-04 | End: 2023-08-07

## 2022-08-04 RX ORDER — MONTELUKAST SODIUM 10 MG/1
TABLET ORAL
Qty: 90 TABLET | Refills: 3 | Status: SHIPPED | OUTPATIENT
Start: 2022-08-04 | End: 2023-08-07

## 2022-08-04 ASSESSMENT — ENCOUNTER SYMPTOMS
DYSURIA: 0
MYALGIAS: 0
HEMATOCHEZIA: 0
COUGH: 0
JOINT SWELLING: 0
FREQUENCY: 0
CONSTIPATION: 0
DIZZINESS: 0
BREAST MASS: 0
CHILLS: 0
HEADACHES: 0
EYE PAIN: 0
HEARTBURN: 0
PALPITATIONS: 0
NAUSEA: 0
NERVOUS/ANXIOUS: 0
PARESTHESIAS: 0
SHORTNESS OF BREATH: 0
WEAKNESS: 0
ABDOMINAL PAIN: 0
FEVER: 0
DIARRHEA: 0
HEMATURIA: 0
ARTHRALGIAS: 0
SORE THROAT: 0

## 2022-08-04 ASSESSMENT — ASTHMA QUESTIONNAIRES
QUESTION_2 LAST FOUR WEEKS HOW OFTEN HAVE YOU HAD SHORTNESS OF BREATH: ONCE OR TWICE A WEEK
QUESTION_1 LAST FOUR WEEKS HOW MUCH OF THE TIME DID YOUR ASTHMA KEEP YOU FROM GETTING AS MUCH DONE AT WORK, SCHOOL OR AT HOME: NONE OF THE TIME
ACT_TOTALSCORE: 24
QUESTION_3 LAST FOUR WEEKS HOW OFTEN DID YOUR ASTHMA SYMPTOMS (WHEEZING, COUGHING, SHORTNESS OF BREATH, CHEST TIGHTNESS OR PAIN) WAKE YOU UP AT NIGHT OR EARLIER THAN USUAL IN THE MORNING: NOT AT ALL
QUESTION_4 LAST FOUR WEEKS HOW OFTEN HAVE YOU USED YOUR RESCUE INHALER OR NEBULIZER MEDICATION (SUCH AS ALBUTEROL): NOT AT ALL
ACT_TOTALSCORE: 24
QUESTION_5 LAST FOUR WEEKS HOW WOULD YOU RATE YOUR ASTHMA CONTROL: COMPLETELY CONTROLLED

## 2022-08-04 ASSESSMENT — PAIN SCALES - GENERAL: PAINLEVEL: NO PAIN (0)

## 2022-08-04 NOTE — PROGRESS NOTES
SUBJECTIVE:   CC: Vibha Caceres is an 22 year old woman who presents for preventive health visit.   Patient has been advised of split billing requirements and indicates understanding: Yes  Healthy Habits:     Getting at least 3 servings of Calcium per day:  NO    Bi-annual eye exam:  Yes    Dental care twice a year:  Yes    Sleep apnea or symptoms of sleep apnea:  Daytime drowsiness    Diet:  Regular (no restrictions)    Frequency of exercise:  None    Taking medications regularly:  Yes    Medication side effects:  Not applicable    PHQ-2 Total Score: 0    Additional concerns today:  No    Trouble sleeping.  Did not fall asleep until 2am.  Tried melatonin    Asthma- well controlled.      Today's PHQ-2 Score:   PHQ-2 ( 1999 Pfizer) 8/4/2022   Q1: Little interest or pleasure in doing things 0   Q2: Feeling down, depressed or hopeless 0   PHQ-2 Score 0   PHQ-2 Total Score (12-17 Years)- Positive if 3 or more points; Administer PHQ-A if positive -   Q1: Little interest or pleasure in doing things Not at all   Q2: Feeling down, depressed or hopeless Not at all   PHQ-2 Score 0       Abuse: Current or Past (Physical, Sexual or Emotional) - No  Do you feel safe in your environment? Yes    Have you ever done Advance Care Planning? (For example, a Health Directive, POLST, or a discussion with a medical provider or your loved ones about your wishes): No, advance care planning information given to patient to review.  Patient declined advance care planning discussion at this time.    Social History     Tobacco Use     Smoking status: Never Smoker     Smokeless tobacco: Never Used   Substance Use Topics     Alcohol use: No         Alcohol Use 8/4/2022   Prescreen: >3 drinks/day or >7 drinks/week? No   Prescreen: >3 drinks/day or >7 drinks/week? -       Reviewed orders with patient.  Reviewed health maintenance and updated orders accordingly - Yes      Breast Cancer Screening:        History of abnormal Pap smear: 22 but never  "sexually active- shared decision making- defer one year.       Reviewed and updated as needed this visit by clinical staff   Tobacco  Allergies    Med Hx  Surg Hx  Fam Hx  Soc Hx          Reviewed and updated as needed this visit by Provider                   Past Medical History:   Diagnosis Date     Acute sinusitis, unspecified     Multiple episodes     Asthma, moderate persistent      Pneumonia, organism unspecified(486) 12/2005     Unspecified asthma, with exacerbation 09/20/2007    Respiratory failure, asthma exacerbation, improving viral illness.      History reviewed. No pertinent surgical history.    Review of Systems   Constitutional: Negative for chills and fever.   HENT: Negative for congestion, ear pain, hearing loss and sore throat.    Eyes: Negative for pain and visual disturbance.   Respiratory: Negative for cough and shortness of breath.    Cardiovascular: Negative for chest pain, palpitations and peripheral edema.   Gastrointestinal: Negative for abdominal pain, constipation, diarrhea, heartburn, hematochezia and nausea.   Breasts:  Negative for tenderness, breast mass and discharge.   Genitourinary: Positive for vaginal discharge. Negative for dysuria, frequency, genital sores, hematuria, pelvic pain, urgency and vaginal bleeding.   Musculoskeletal: Negative for arthralgias, joint swelling and myalgias.   Skin: Negative for rash.   Neurological: Negative for dizziness, weakness, headaches and paresthesias.   Psychiatric/Behavioral: Negative for mood changes. The patient is not nervous/anxious.         OBJECTIVE:   /60   Pulse 96   Temp 97.8  F (36.6  C) (Temporal)   Ht 1.638 m (5' 4.5\")   Wt 65.3 kg (144 lb)   LMP 06/28/2022   SpO2 96%   BMI 24.34 kg/m    Physical Exam  GENERAL: healthy, alert and no distress  EYES: Eyes grossly normal to inspection, PERRL and conjunctivae and sclerae normal  HENT: ear canals and TM's normal, nose and mouth without ulcers or lesions  NECK: no " "adenopathy, no asymmetry, masses, or scars and thyroid normal to palpation  RESP: lungs clear to auscultation - no rales, rhonchi or wheezes  BREAST: declined  CV: regular rate and rhythm, normal S1 S2, no S3 or S4, no murmur, click or rub, no peripheral edema and peripheral pulses strong  ABDOMEN: soft, nontender, no hepatosplenomegaly, no masses and bowel sounds normal   (female): deferred  MS: no gross musculoskeletal defects noted, no edema  SKIN: no suspicious lesions or rashes  NEURO: Normal strength and tone, mentation intact and speech normal  PSYCH: mentation appears normal, affect normal/bright      ASSESSMENT/PLAN:   (Z00.00) Routine general medical examination at a health care facility  (primary encounter diagnosis)  Comment: recommend yearly physical  Plan: REVIEW OF HEALTH MAINTENANCE PROTOCOL ORDERS    (J45.20) Asthma, mild intermittent, well-controlled  Comment: ACT is 24, AAP completed.  Good control    (G47.00) Persistent insomnia  Comment: trial hydroxyzine prn.  Good sleep hygiene discussed  Plan: hydrOXYzine (ATARAX) 25 MG tablet    (Z12.4) Cervical cancer screening  Comment: patient has never been sexually active.  Shared decision making and will defer pap for now.        Patient has been advised of split billing requirements and indicates understanding: Yes    COUNSELING:  Reviewed preventive health counseling, as reflected in patient instructions    Estimated body mass index is 24.34 kg/m  as calculated from the following:    Height as of this encounter: 1.638 m (5' 4.5\").    Weight as of this encounter: 65.3 kg (144 lb).        She reports that she has never smoked. She has never used smokeless tobacco.      Counseling Resources:  ATP IV Guidelines  Pooled Cohorts Equation Calculator  Breast Cancer Risk Calculator  BRCA-Related Cancer Risk Assessment: FHS-7 Tool  FRAX Risk Assessment  ICSI Preventive Guidelines  Dietary Guidelines for Americans, 2010  USDA's MyPlate  ASA Prophylaxis  Lung " CA Screening    Maria Eugenia Wagner NP  [facility]

## 2022-08-04 NOTE — LETTER
My Asthma Action Plan    Name: Vibha Caceres   YOB: 2000  Date: 8/4/2022   My doctor: Maria Eugenia Wagner NP   My clinic: [facility]        My Rescue Medicine:   Albuterol inhaler (Proair/Ventolin/Proventil HFA)  2-4 puffs EVERY 4 HOURS as needed. Use a spacer if recommended by your provider.   My Asthma Severity:   Intermittent / Exercise Induced  Know your asthma triggers: upper respiratory infections  smoke  dust mites  pollens  animal dander  mold  humidity  strong odors and fumes  exercise or sports          GREEN ZONE   Good Control    I feel good    No cough or wheeze    Can work, sleep and play without asthma symptoms       Take your asthma control medicine every day.     1. If exercise triggers your asthma, take your rescue medication    15 minutes before exercise or sports, and    During exercise if you have asthma symptoms  2. Spacer to use with inhaler: If you have a spacer, make sure to use it with your inhaler             YELLOW ZONE Getting Worse  I have ANY of these:    I do not feel good    Cough or wheeze    Chest feels tight    Wake up at night   1. Keep taking your Green Zone medications  2. Start taking your rescue medicine:    every 20 minutes for up to 1 hour. Then every 4 hours for 24-48 hours.  3. If you stay in the Yellow Zone for more than 12-24 hours, contact your doctor.  4. If you do not return to the Green Zone in 12-24 hours or you get worse, start taking your oral steroid medicine if prescribed by your provider.           RED ZONE Medical Alert - Get Help  I have ANY of these:    I feel awful    Medicine is not helping    Breathing getting harder    Trouble walking or talking    Nose opens wide to breathe       1. Take your rescue medicine NOW  2. If your provider has prescribed an oral steroid medicine, start taking it NOW  3. Call your doctor NOW  4. If you are still in the Red Zone after 20 minutes and you have not reached your  doctor:    Take your rescue medicine again and    Call 911 or go to the emergency room right away    See your regular doctor within 2 weeks of an Emergency Room or Urgent Care visit for follow-up treatment.          Annual Reminders:  Meet with Asthma Educator,  Flu Shot in the Fall, consider Pneumonia Vaccination for patients with asthma (aged 19 and older).    Pharmacy: [facility] [address]    Electronically signed by Maria Eugenia Wagner NP   Date: 08/04/22                    Asthma Triggers  How To Control Things That Make Your Asthma Worse    Triggers are things that make your asthma worse.  Look at the list below to help you find your triggers and   what you can do about them. You can help prevent asthma flare-ups by staying away from your triggers.      Trigger                                                          What you can do   Cigarette Smoke  Tobacco smoke can make asthma worse. Do not allow smoking in your home, car or around you.  Be sure no one smokes at a child s day care or school.  If you smoke, ask your health care provider for ways to help you quit.  Ask family members to quit too.  Ask your health care provider for a referral to Quit Plan to help you quit smoking, or call 5-094-595-PLAN.     Colds, Flu, Bronchitis  These are common triggers of asthma. Wash your hands often.  Don t touch your eyes, nose or mouth.  Get a flu shot every year.     Dust Mites  These are tiny bugs that live in cloth or carpet. They are too small to see. Wash sheets and blankets in hot water every week.   Encase pillows and mattress in dust mite proof covers.  Avoid having carpet if you can. If you have carpet, vacuum weekly.   Use a dust mask and HEPA vacuum.   Pollen and Outdoor Mold  Some people are allergic to trees, grass, or weed pollen, or molds. Try to keep your windows closed.  Limit time out doors when pollen count is high.   Ask you health care provider about taking  medicine during allergy season.     Animal Dander  Some people are allergic to skin flakes, urine or saliva from pets with fur or feathers. Keep pets with fur or feathers out of your home.    If you can t keep the pet outdoors, then keep the pet out of your bedroom.  Keep the bedroom door closed.  Keep pets off cloth furniture and away from stuffed toys.     Mice, Rats, and Cockroaches  Some people are allergic to the waste from these pests.   Cover food and garbage.  Clean up spills and food crumbs.  Store grease in the refrigerator.   Keep food out of the bedroom.   Indoor Mold  This can be a trigger if your home has high moisture. Fix leaking faucets, pipes, or other sources of water.   Clean moldy surfaces.  Dehumidify basement if it is damp and smelly.   Smoke, Strong Odors, and Sprays  These can reduce air quality. Stay away from strong odors and sprays, such as perfume, powder, hair spray, paints, smoke incense, paint, cleaning products, candles and new carpet.   Exercise or Sports  Some people with asthma have this trigger. Be active!  Ask your doctor about taking medicine before sports or exercise to prevent symptoms.    Warm up for 5-10 minutes before and after sports or exercise.     Other Triggers of Asthma  Cold air:  Cover your nose and mouth with a scarf.  Sometimes laughing or crying can be a trigger.  Some medicines and food can trigger asthma.

## 2022-08-04 NOTE — PATIENT INSTRUCTIONS
Add vitamin D 800-1000 units    Preventive Health Recommendations  Female Ages 21 to 25     Yearly exam:     See your health care provider every year in order to  o Review health changes.   o Discuss preventive care.    o Review your medicines if your doctor has prescribed any.      You should be tested each year for STDs (sexually transmitted diseases).       Talk to your provider about how often you should have cholesterol testing.      Get a Pap test every three years. If you have an abnormal result, your doctor may have you test more often.      If you are at risk for diabetes, you should have a diabetes test (fasting glucose).     Shots:     Get a flu shot each year.     Get a tetanus shot every 10 years.     Consider getting the shot (vaccine) that prevents cervical cancer (Gardasil).    Nutrition:     Eat at least 5 servings of fruits and vegetables each day.    Eat whole-grain bread, whole-wheat pasta and brown rice instead of white grains and rice.    Get adequate Calcium and Vitamin D.     Lifestyle    Exercise at least 150 minutes a week each week (30 minutes a day, 5 days a week). This will help you control your weight and prevent disease.    Limit alcohol to one drink per day.    No smoking.     Wear sunscreen to prevent skin cancer.    See your dentist every six months for an exam and cleaning.  Preventive Health Recommendations  Female Ages 21 to 25     Yearly exam:   See your health care provider every year in order to  Review health changes.   Discuss preventive care.    Review your medicines if your doctor has prescribed any.    You should be tested each year for STDs (sexually transmitted diseases).     Talk to your provider about how often you should have cholesterol testing.    Get a Pap test every three years. If you have an abnormal result, your doctor may have you test more often.    If you are at risk for diabetes, you should have a diabetes test (fasting glucose).     Shots:   Get a flu  shot each year.   Get a tetanus shot every 10 years.   Consider getting the shot (vaccine) that prevents cervical cancer (Gardasil).    Nutrition:   Eat at least 5 servings of fruits and vegetables each day.  Eat whole-grain bread, whole-wheat pasta and brown rice instead of white grains and rice.  Get adequate Calcium and Vitamin D.     Lifestyle  Exercise at least 150 minutes a week each week (30 minutes a day, 5 days a week). This will help you control your weight and prevent disease.  Limit alcohol to one drink per day.  No smoking.   Wear sunscreen to prevent skin cancer.  See your dentist every six months for an exam and cleaning.

## 2022-12-18 ENCOUNTER — HOSPITAL ENCOUNTER (EMERGENCY)
Facility: CLINIC | Age: 22
Discharge: HOME | End: 2022-12-19
Attending: FAMILY MEDICINE | Admitting: FAMILY MEDICINE
Payer: COMMERCIAL

## 2022-12-18 VITALS
SYSTOLIC BLOOD PRESSURE: 144 MMHG | TEMPERATURE: 98.2 F | OXYGEN SATURATION: 100 % | DIASTOLIC BLOOD PRESSURE: 79 MMHG | RESPIRATION RATE: 18 BRPM | HEART RATE: 97 BPM

## 2022-12-18 DIAGNOSIS — R10.13 ABDOMINAL PAIN, EPIGASTRIC: ICD-10-CM

## 2022-12-18 DIAGNOSIS — R11.2 NAUSEA AND VOMITING, UNSPECIFIED VOMITING TYPE: ICD-10-CM

## 2022-12-18 PROCEDURE — 250N000013 HC RX MED GY IP 250 OP 250 PS 637: Performed by: FAMILY MEDICINE

## 2022-12-18 PROCEDURE — 99284 EMERGENCY DEPT VISIT MOD MDM: CPT | Performed by: FAMILY MEDICINE

## 2022-12-18 PROCEDURE — 81001 URINALYSIS AUTO W/SCOPE: CPT | Performed by: FAMILY MEDICINE

## 2022-12-18 RX ORDER — SODIUM CHLORIDE 9 MG/ML
INJECTION, SOLUTION INTRAVENOUS CONTINUOUS
Status: DISCONTINUED | OUTPATIENT
Start: 2022-12-19 | End: 2022-12-19 | Stop reason: HOSPADM

## 2022-12-18 RX ORDER — KETOROLAC TROMETHAMINE 30 MG/ML
30 INJECTION, SOLUTION INTRAMUSCULAR; INTRAVENOUS ONCE
Status: DISCONTINUED | OUTPATIENT
Start: 2022-12-18 | End: 2022-12-19 | Stop reason: HOSPADM

## 2022-12-18 RX ORDER — LORAZEPAM 1 MG/1
1 TABLET ORAL ONCE
Status: COMPLETED | OUTPATIENT
Start: 2022-12-18 | End: 2022-12-18

## 2022-12-18 RX ADMIN — LORAZEPAM 1 MG: 1 TABLET ORAL at 23:39

## 2022-12-18 ASSESSMENT — ACTIVITIES OF DAILY LIVING (ADL): ADLS_ACUITY_SCORE: 35

## 2022-12-19 LAB
ALBUMIN UR-MCNC: NEGATIVE MG/DL
AMORPH CRY #/AREA URNS HPF: ABNORMAL /HPF
APPEARANCE UR: ABNORMAL
BILIRUB UR QL STRIP: NEGATIVE
COLOR UR AUTO: YELLOW
GLUCOSE UR STRIP-MCNC: NEGATIVE MG/DL
HGB UR QL STRIP: NEGATIVE
KETONES UR STRIP-MCNC: NEGATIVE MG/DL
LEUKOCYTE ESTERASE UR QL STRIP: NEGATIVE
MUCOUS THREADS #/AREA URNS LPF: PRESENT /LPF
NITRATE UR QL: NEGATIVE
PH UR STRIP: 8.5 [PH] (ref 5–7)
RBC URINE: 0 /HPF
SP GR UR STRIP: 1.02 (ref 1–1.03)
SQUAMOUS EPITHELIAL: 1 /HPF
UROBILINOGEN UR STRIP-MCNC: NORMAL MG/DL
WBC URINE: 0 /HPF

## 2022-12-19 RX ORDER — ONDANSETRON 4 MG/1
4 TABLET, FILM COATED ORAL EVERY 6 HOURS PRN
Qty: 12 TABLET | Refills: 0 | Status: SHIPPED | OUTPATIENT
Start: 2022-12-19 | End: 2022-12-24

## 2022-12-19 NOTE — ED NOTES
Patient refusing IV placement, states her pain has resolved and that she would like to go home. Provider made aware.

## 2022-12-19 NOTE — ED PROVIDER NOTES
History     Chief Complaint   Patient presents with     Abdominal Pain     HPI  Vibha Caceres is a 22 year old female who presents to the ED tonight with epigastric pain and vomiting.  She noted pain in her epigastric region shortly after eating lasagna for dinner tonight.  Then felt nauseous and had emesis x1 and actually felt better afterwards.  Pain gradually increased again and she went to bed.  She typically lays on her stomach but it hurt too bad to do so.  Even laying on her side was uncomfortable.  She was not able to get to sleep so she told her mom who recommended that she come to the ED.  She denies any persistent nausea.  The pain in her epigastric region does not radiate to the sides over the back.  Has not had any pain like this before.  No one else at home is sick.  She had a normal bowel movement earlier today.  No diarrhea or blood in the stool.  No fevers chills or sweats.  Last menstrual period was about 3 or 4 weeks ago.  She is not sexually active and denies chance of pregnancy.  No dysuria or hematuria    Allergies:  Allergies   Allergen Reactions     Amoxicillin-Pot Clavulanate Hives     Cat Hair [Cats]      Dog Hair [Dogs]      Dust Mites      Milk [Lactose]      Milk - is able to eat cheese and ice cream and baked goods with milk, just not able to drink milk Per Dr. Carballo, Pediatric Pulmonary and infectious disease at [place]     Mold      Trees      Amoxicillin Rash     Cefprozil Rash       Problem List:    Patient Active Problem List    Diagnosis Date Noted     Seasonal allergic rhinitis 07/13/2012     Priority: Medium     Scoliosis 07/22/2011     Priority: Medium     Asthma, mild intermittent, well-controlled      Priority: Medium        Past Medical History:    Past Medical History:   Diagnosis Date     Acute sinusitis, unspecified      Asthma, moderate persistent      Pneumonia, organism unspecified(486) 12/2005     Unspecified asthma, with exacerbation 09/20/2007       Past  Surgical History:    No past surgical history on file.    Family History:    Family History   Problem Relation Age of Onset     Gynecology Mother         ovarian hyperplasia     Asthma Father      Allergies Father      Prostate Cancer Maternal Grandfather      Thyroid Disease Maternal Grandfather      Thyroid Disease Paternal Grandmother         hypo       Social History:  Marital Status:  Single [1]  Social History     Tobacco Use     Smoking status: Never     Smokeless tobacco: Never   Substance Use Topics     Alcohol use: No     Drug use: No        Medications:    ondansetron (ZOFRAN) 4 MG tablet  albuterol (VENTOLIN HFA) 108 (90 Base) MCG/ACT inhaler  Calcium Carb-Cholecalciferol 500-400 MG-UNIT TABS  cetirizine (ZYRTEC) 10 MG tablet  fluticasone (FLOVENT HFA) 220 MCG/ACT inhaler  montelukast (SINGULAIR) 10 MG tablet  triamcinolone (KENALOG) 0.1 % external cream          Review of Systems   All other systems reviewed and are negative.      Physical Exam   BP: (!) 144/79  Pulse: 97  Temp: 98.2  F (36.8  C)  Resp: 18  SpO2: 100 %      Physical Exam  Constitutional:       General: She is in acute distress (very anxious/scared of IV/blood draw).      Appearance: She is well-developed.   HENT:      Mouth/Throat:      Mouth: Mucous membranes are moist.      Pharynx: Oropharynx is clear.   Cardiovascular:      Rate and Rhythm: Normal rate and regular rhythm.   Pulmonary:      Effort: Pulmonary effort is normal.      Breath sounds: Normal breath sounds.   Abdominal:      Tenderness: There is abdominal tenderness in the right upper quadrant and epigastric area. There is no right CVA tenderness, left CVA tenderness, guarding or rebound.   Musculoskeletal:         General: Normal range of motion.   Skin:     General: Skin is warm and dry.   Neurological:      General: No focal deficit present.      Mental Status: She is alert and oriented to person, place, and time.   Psychiatric:         Mood and Affect: Mood normal.          ED Course                 Procedures              Critical Care time:  none               Results for orders placed or performed during the hospital encounter of 12/18/22 (from the past 24 hour(s))   UA with Microscopic reflex to Culture    Specimen: Urine, Clean Catch   Result Value Ref Range    Color Urine Yellow Colorless, Straw, Light Yellow, Yellow    Appearance Urine Cloudy (A) Clear    Glucose Urine Negative Negative mg/dL    Bilirubin Urine Negative Negative    Ketones Urine Negative Negative mg/dL    Specific Gravity Urine 1.020 1.003 - 1.035    Blood Urine Negative Negative    pH Urine 8.5 (H) 5.0 - 7.0    Protein Albumin Urine Negative Negative mg/dL    Urobilinogen Urine Normal Normal, 2.0 mg/dL    Nitrite Urine Negative Negative    Leukocyte Esterase Urine Negative Negative    Mucus Urine Present (A) None Seen /LPF    Amorphous Crystals Urine Few (A) None Seen /HPF    RBC Urine 0 <=2 /HPF    WBC Urine 0 <=5 /HPF    Squamous Epithelials Urine 1 <=1 /HPF    Narrative    Urine Culture not indicated       Medications   0.9% sodium chloride BOLUS (has no administration in time range)     Followed by   sodium chloride 0.9% infusion (has no administration in time range)   ketorolac (TORADOL) injection 30 mg (has no administration in time range)   LORazepam (ATIVAN) tablet 1 mg (1 mg Oral Given 12/18/22 3029)       Assessments & Plan (with Medical Decision Making)  22-year-old with epigastric pain and vomiting after eating lasagna for dinner tonight.  Nausea has resolved and she initially felt better after vomiting but the pain has now recurred.  No associated fevers chills sweats diarrhea constipation or blood in the stools.  She is very anxious so was given Ativan 1 mg orally before IV placement/blood draw.  I ordered saline and Toradol for pain.  She declined any nausea medication.  Nursing staff  In after the Ativan had a chance to help her settle down and she now declined IV placement and blood  draw.  She states that her pain has resolved and she would like to go home.  That is certainly reasonable.  If her pain recurs or her symptoms change, she will return for reevaluation.  If this is the start of something significant in her abdomen, it will eventually declare itself.  Could have gastritis, gallbladder disease, less likely pancreatitis or possibly even early appendicitis.  We reviewed all these possibilities, discussed reportable signs and when to return.  She can trial over-the-counter Prilosec/Mylanta and will watch for recurrence of her pain after eating, especially fatty foods.  If her pain localizes to the right lower part of her abdomen, she will return for reevaluation.  Verbal and written discharge instruction given.  Patient and her mom are comfortable with this plan.  I did give her a prescription for Zofran ODT that she can fill if needed.         I have reviewed the nursing notes.    I have reviewed the findings, diagnosis, plan and need for follow up with the patient.       Discharge Medication List as of 12/19/2022 12:29 AM      START taking these medications    Details   ondansetron (ZOFRAN) 4 MG tablet Take 1 tablet (4 mg) by mouth every 6 hours as needed for nausea, Disp-12 tablet, R-0, Local Print             Final diagnoses:   Abdominal pain, epigastric   Nausea and vomiting, unspecified vomiting type       12/18/2022   [facility] EMERGENCY DEPT     Oskar Enrique MD  12/19/22 0041

## 2022-12-19 NOTE — ED NOTES
Discussed IV placement with patient and parent. Patient very apprehensive about needles and concerned that the needle will stay inside her arm during placement. Explained proper procedure with IV placement, with demonstration how the needle retracts once catheter is in place. Patient states that this has eased some anxiety but that she still wants the PO Ativan and to have writer re-check with her in about 30 minutes. Advised will be back before 0030 and that we will do the IV placement at that time, patient verbalizes understanding. UA sample collected and sent.

## 2022-12-19 NOTE — DISCHARGE INSTRUCTIONS
You can use the Zofran ODT as needed for nausea.  Encourage fluids and advance diet as tolerated.  I am glad that your pain and nausea/vomiting has resolved.  If it recurs, we may need to look again more closely.   If this is an early appendicitis, it will eventually declare itself.    This could also be your gallbladder.  Watch for recurrent pain especially after eating fatty foods.    You could also have some irritation in your stomach.  You can use an over-the-counter acid medicine such as Prilosec or liquid antacid such as Mylanta.      Recheck in clinic if persistent problems.  Return to the ED if you worsen or have any concerns.  It was nice to see you and your mom tonight.  I am glad you are feeling better and hope you continue to do well.    Thank you for choosing [facility]. We appreciate the opportunity to meet your urgent medical needs. Please let us know if we could have done anything to make your stay more satisfying.    After discharge, please closely monitor for any new or worsening symptoms. Return to the Emergency Department if you develop any acute worsening signs or symptoms.    If you had lab work, cultures or imaging studies done during your stay, the final results may still be pending. We will call you if your plan of care needs to change. However, if you are not improving as expected, please follow up with your primary care provider or clinic.     Start any prescription medications that were prescribed to you and take them as directed.     Please see additional handouts that may be pertinent to your condition.

## 2023-06-09 ENCOUNTER — TELEPHONE (OUTPATIENT)
Dept: FAMILY MEDICINE | Facility: CLINIC | Age: 23
End: 2023-06-09
Payer: COMMERCIAL

## 2023-06-09 NOTE — TELEPHONE ENCOUNTER
Patient is needing her yearly adult well physical, after August 4th.    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  She would like to be seen before Labor Day.    Please call patient with date and time.    Natalie Rose XRO/

## 2023-08-06 ASSESSMENT — ENCOUNTER SYMPTOMS: BREAST MASS: 0

## 2023-08-06 ASSESSMENT — ASTHMA QUESTIONNAIRES: ACT_TOTALSCORE: 20

## 2023-08-07 ENCOUNTER — OFFICE VISIT (OUTPATIENT)
Dept: FAMILY MEDICINE | Facility: CLINIC | Age: 23
End: 2023-08-07
Payer: COMMERCIAL

## 2023-08-07 VITALS
BODY MASS INDEX: 23.16 KG/M2 | SYSTOLIC BLOOD PRESSURE: 132 MMHG | HEIGHT: 65 IN | TEMPERATURE: 97.8 F | DIASTOLIC BLOOD PRESSURE: 74 MMHG | WEIGHT: 139 LBS | HEART RATE: 82 BPM | OXYGEN SATURATION: 96 %

## 2023-08-07 DIAGNOSIS — Z00.00 ROUTINE GENERAL MEDICAL EXAMINATION AT A HEALTH CARE FACILITY: Primary | ICD-10-CM

## 2023-08-07 DIAGNOSIS — J45.20 ASTHMA, MILD INTERMITTENT, WELL-CONTROLLED: ICD-10-CM

## 2023-08-07 DIAGNOSIS — Z12.4 CERVICAL CANCER SCREENING: ICD-10-CM

## 2023-08-07 PROCEDURE — 99213 OFFICE O/P EST LOW 20 MIN: CPT | Mod: 25 | Performed by: NURSE PRACTITIONER

## 2023-08-07 PROCEDURE — 90471 IMMUNIZATION ADMIN: CPT | Performed by: NURSE PRACTITIONER

## 2023-08-07 PROCEDURE — 99395 PREV VISIT EST AGE 18-39: CPT | Mod: 25 | Performed by: NURSE PRACTITIONER

## 2023-08-07 PROCEDURE — 90715 TDAP VACCINE 7 YRS/> IM: CPT | Performed by: NURSE PRACTITIONER

## 2023-08-07 RX ORDER — MONTELUKAST SODIUM 10 MG/1
TABLET ORAL
Qty: 90 TABLET | Refills: 3 | Status: SHIPPED | OUTPATIENT
Start: 2023-08-07

## 2023-08-07 RX ORDER — FLUTICASONE PROPIONATE 220 UG/1
2 AEROSOL, METERED RESPIRATORY (INHALATION) 2 TIMES DAILY
Qty: 12 G | Refills: 3 | Status: SHIPPED | OUTPATIENT
Start: 2023-08-07

## 2023-08-07 RX ORDER — ALBUTEROL SULFATE 90 UG/1
AEROSOL, METERED RESPIRATORY (INHALATION)
Qty: 36 G | Refills: 4 | Status: SHIPPED | OUTPATIENT
Start: 2023-08-07

## 2023-08-07 ASSESSMENT — PAIN SCALES - GENERAL: PAINLEVEL: NO PAIN (0)

## 2023-08-07 ASSESSMENT — ENCOUNTER SYMPTOMS: BREAST MASS: 0

## 2023-08-07 NOTE — NURSING NOTE
Prior to immunization administration, verified patients identity using patient s name and date of birth. Please see Immunization Activity for additional information.     Screening Questionnaire for Adult Immunization    Are you sick today?   No   Do you have allergies to medications, food, a vaccine component or latex?   No   Have you ever had a serious reaction after receiving a vaccination?   No   Do you have a long-term health problem with heart, lung, kidney, or metabolic disease (e.g., diabetes), asthma, a blood disorder, no spleen, complement component deficiency, a cochlear implant, or a spinal fluid leak?  Are you on long-term aspirin therapy?   No   Do you have cancer, leukemia, HIV/AIDS, or any other immune system problem?   No   Do you have a parent, brother, or sister with an immune system problem?   No   In the past 3 months, have you taken medications that affect  your immune system, such as prednisone, other steroids, or anticancer drugs; drugs for the treatment of rheumatoid arthritis, Crohn s disease, or psoriasis; or have you had radiation treatments?   No   Have you had a seizure, or a brain or other nervous system problem?   No   During the past year, have you received a transfusion of blood or blood    products, or been given immune (gamma) globulin or antiviral drug?   No   For women: Are you pregnant or is there a chance you could become       pregnant during the next month?   No   Have you received any vaccinations in the past 4 weeks?   No     Immunization questionnaire answers were all negative.      Patient instructed to remain in clinic for 15 minutes afterwards, and to report any adverse reactions.     Screening performed by Liana Sams MA on 8/7/2023 at 9:09 AM.

## 2023-08-07 NOTE — LETTER
My Asthma Action Plan    Name: Vibha Caceres   YOB: 2000  Date: 8/7/2023   My doctor: Maria Eugenia Wagner NP   My clinic: [facility]        My Control Medicine: INHALED CORTICOSTEROIDS  My Rescue Medicine: Albuterol (Proair/Ventolin/Proventil HFA) 2-4 puffs EVERY 4 HOURS as needed. Use a spacer if recommended by your provider.   My Asthma Severity:   Mild Persistent  Know your asthma triggers: upper respiratory infections  smoke  dust mites  pollens  animal dander  mold  humidity  strong odors and fumes  exercise or sports            GREEN ZONE   Good Control  I feel good  No cough or wheeze  Can work, sleep and play without asthma symptoms       Take your asthma control medicine every day.     If exercise triggers your asthma, take your rescue medication  15 minutes before exercise or sports, and  During exercise if you have asthma symptoms  Spacer to use with inhaler: If you have a spacer, make sure to use it with your inhaler             YELLOW ZONE Getting Worse  I have ANY of these:  I do not feel good  Cough or wheeze  Chest feels tight  Wake up at night   Keep taking your Green Zone medications  Start taking your rescue medicine:  every 20 minutes for up to 1 hour. Then every 4 hours for 24-48 hours.  If you stay in the Yellow Zone for more than 12-24 hours, contact your doctor.  If you do not return to the Green Zone in 12-24 hours or you get worse, start taking your oral steroid medicine if prescribed by your provider.           RED ZONE Medical Alert - Get Help  I have ANY of these:  I feel awful  Medicine is not helping  Breathing getting harder  Trouble walking or talking  Nose opens wide to breathe       Take your rescue medicine NOW  If your provider has prescribed an oral steroid medicine, start taking it NOW  Call your doctor NOW  If you are still in the Red Zone after 20 minutes and you have not reached your doctor:  Take your rescue medicine again and  Call  911 or go to the emergency room right away    See your regular doctor within 2 weeks of an Emergency Room or Urgent Care visit for follow-up treatment.          Annual Reminders:  Meet with Asthma Educator,  Flu Shot in the Fall, consider Pneumonia Vaccination for patients with asthma (aged 19 and older).    Pharmacy: [facility] [id number]    Electronically signed by Maria Eugenia Wagner NP   Date: 08/07/23                      Asthma Triggers  How To Control Things That Make Your Asthma Worse    Triggers are things that make your asthma worse.  Look at the list below to help you find your triggers and what you can do about them.  You can help prevent asthma flare-ups by staying away from your triggers.      Trigger                                                          What you can do   Cigarette Smoke  Tobacco smoke can make asthma worse. Do not allow smoking in your home, car or around you.  Be sure no one smokes at a child s day care or school.  If you smoke, ask your health care provider for ways to help you quit.  Ask family members to quit too.  Ask your health care provider for a referral to Quit Plan to help you quit smoking, or call 9-930-131-PLAN.     Colds, Flu, Bronchitis  These are common triggers of asthma. Wash your hands often.  Don t touch your eyes, nose or mouth.  Get a flu shot every year.     Dust Mites  These are tiny bugs that live in cloth or carpet. They are too small to see. Wash sheets and blankets in hot water every week.   Encase pillows and mattress in dust mite proof covers.  Avoid having carpet if you can. If you have carpet, vacuum weekly.   Use a dust mask and HEPA vacuum.   Pollen and Outdoor Mold  Some people are allergic to trees, grass, or weed pollen, or molds. Try to keep your windows closed.  Limit time out doors when pollen count is high.   Ask you health care provider about taking medicine during allergy season.     Animal Dander  Some  people are allergic to skin flakes, urine or saliva from pets with fur or feathers. Keep pets with fur or feathers out of your home.    If you can t keep the pet outdoors, then keep the pet out of your bedroom.  Keep the bedroom door closed.  Keep pets off cloth furniture and away from stuffed toys.     Mice, Rats, and Cockroaches   Some people are allergic to the waste from these pests.   Cover food and garbage.  Clean up spills and food crumbs.  Store grease in the refrigerator.   Keep food out of the bedroom.   Indoor Mold  This can be a trigger if your home has high moisture. Fix leaking faucets, pipes, or other sources of water.   Clean moldy surfaces.  Dehumidify basement if it is damp and smelly.   Smoke, Strong Odors, and Sprays  These can reduce air quality. Stay away from strong odors and sprays, such as perfume, powder, hair spray, paints, smoke incense, paint, cleaning products, candles and new carpet.   Exercise or Sports  Some people with asthma have this trigger. Be active!  Ask your doctor about taking medicine before sports or exercise to prevent symptoms.    Warm up for 5-10 minutes before and after sports or exercise.     Other Triggers of Asthma  Cold air:  Cover your nose and mouth with a scarf.  Sometimes laughing or crying can be a trigger.  Some medicines and food can trigger asthma.

## 2023-08-07 NOTE — PATIENT INSTRUCTIONS
Zyrtec, singulair daily, flonase, flovent    Preventive Health Recommendations  Female Ages 21 to 25     Yearly exam:   See your health care provider every year in order to  Review health changes.   Discuss preventive care.    Review your medicines if your doctor has prescribed any.    You should be tested each year for STDs (sexually transmitted diseases).     Talk to your provider about how often you should have cholesterol testing.    Get a Pap test every three years. If you have an abnormal result, your doctor may have you test more often.    If you are at risk for diabetes, you should have a diabetes test (fasting glucose).     Shots:   Get a flu shot each year.   Get a tetanus shot every 10 years.   Consider getting the shot (vaccine) that prevents cervical cancer (Gardasil).    Nutrition:   Eat at least 5 servings of fruits and vegetables each day.  Eat whole-grain bread, whole-wheat pasta and brown rice instead of white grains and rice.  Get adequate Calcium and Vitamin D.     Lifestyle  Exercise at least 150 minutes a week each week (30 minutes a day, 5 days a week). This will help you control your weight and prevent disease.  Limit alcohol to one drink per day.  No smoking.   Wear sunscreen to prevent skin cancer.  See your dentist every six months for an exam and cleaning.

## 2023-08-07 NOTE — PROGRESS NOTES
Prior to immunization administration, verified patients identity using patient s name and date of birth. Please see Immunization Activity for additional information.     Screening Questionnaire for Adult Immunization    Are you sick today?   No   Do you have allergies to medications, food, a vaccine component or latex?   Yes   Have you ever had a serious reaction after receiving a vaccination?   No   Do you have a long-term health problem with heart, lung, kidney, or metabolic disease (e.g., diabetes), asthma, a blood disorder, no spleen, complement component deficiency, a cochlear implant, or a spinal fluid leak?  Are you on long-term aspirin therapy?   Yes   Do you have cancer, leukemia, HIV/AIDS, or any other immune system problem?   No   Do you have a parent, brother, or sister with an immune system problem?   No   In the past 3 months, have you taken medications that affect  your immune system, such as prednisone, other steroids, or anticancer drugs; drugs for the treatment of rheumatoid arthritis, Crohn s disease, or psoriasis; or have you had radiation treatments?   No   Have you had a seizure, or a brain or other nervous system problem?   No   During the past year, have you received a transfusion of blood or blood    products, or been given immune (gamma) globulin or antiviral drug?   No   For women: Are you pregnant or is there a chance you could become       pregnant during the next month?   No   Have you received any vaccinations in the past 4 weeks?   No     Immunization questionnaire was positive for at least one answer.  Notified Yes.      Patient instructed to remain in clinic for 15 minutes afterwards, and to report any adverse reactions.     Screening performed by Salome Manrique CMA on 8/7/2023 at 9:37 AM.

## 2023-08-07 NOTE — PROGRESS NOTES
SUBJECTIVE:   CC: Vibha is an 23 year old who presents for preventive health visit.       8/7/2023     9:05 AM   Additional Questions   Roomed by Liana NEAL       Healthy Habits:     Getting at least 3 servings of Calcium per day:  NO    Bi-annual eye exam:  Yes    Dental care twice a year:  Yes    Sleep apnea or symptoms of sleep apnea:  Daytime drowsiness    Diet:  Regular (no restrictions)    Frequency of exercise:  None    Taking medications regularly:  Yes    Medication side effects:  None    Additional concerns today:  No      Today's PHQ-2 Score:       8/6/2023     8:59 PM   PHQ-2 ( 1999 Pf[employer])   Q1: Little interest or pleasure in doing things 0   Q2: Feeling down, depressed or hopeless 0   PHQ-2 Score 0   Q1: Little interest or pleasure in doing things Not at all   Q2: Feeling down, depressed or hopeless Not at all   PHQ-2 Score 0         Social History     Tobacco Use    Smoking status: Never    Smokeless tobacco: Never   Substance Use Topics    Alcohol use: No             8/6/2023     8:58 PM   Alcohol Use   Prescreen: >3 drinks/day or >7 drinks/week? No     Reviewed orders with patient.  Reviewed health maintenance and updated orders accordingly - Yes      Breast Cancer Screening:        History of abnormal Pap smear: has never been sexually active     Reviewed and updated as needed this visit by clinical staff   Tobacco  Allergies  Meds  Problems  Med Hx  Surg Hx  Fam Hx          Reviewed and updated as needed this visit by Provider   Tobacco  Allergies  Meds  Problems  Med Hx  Surg Hx  Fam Hx         Past Medical History:   Diagnosis Date    Acute sinusitis, unspecified     Multiple episodes    Asthma, moderate persistent     Pneumonia, organism unspecified(486) 12/2005    Unspecified asthma, with exacerbation 09/20/2007    Respiratory failure, asthma exacerbation, improving viral illness.      History reviewed. No pertinent surgical history.    Review of Systems   Breasts:  Negative for  "tenderness, breast mass and discharge.   Genitourinary:  Negative for pelvic pain, vaginal bleeding and vaginal discharge.          OBJECTIVE:   /74   Pulse 82   Temp 97.8  F (36.6  C) (Temporal)   Ht 1.645 m (5' 4.76\")   Wt 63 kg (139 lb)   SpO2 96%   BMI 23.30 kg/m    Physical Exam  GENERAL: healthy, alert and no distress  EYES: Eyes grossly normal to inspection, PERRL and conjunctivae and sclerae normal  HENT: ear canals and TM's normal, nose and mouth without ulcers or lesions  NECK: no adenopathy, no asymmetry, masses, or scars and thyroid normal to palpation  RESP: lungs clear to auscultation - no rales, rhonchi or wheezes  CV: regular rate and rhythm, normal S1 S2, no S3 or S4, no murmur, click or rub, no peripheral edema and peripheral pulses strong  ABDOMEN: soft, nontender, no hepatosplenomegaly, no masses and bowel sounds normal  MS: no gross musculoskeletal defects noted, no edema  SKIN: no suspicious lesions or rashes  NEURO: Normal strength and tone, mentation intact and speech normal  PSYCH: mentation appears normal, affect normal/bright    Diagnostic Test Results:  Labs reviewed in Epic  none     ASSESSMENT/PLAN:   (Z00.00) Routine general medical examination at a health care facility  (primary encounter diagnosis)  Comment: recommend yearly physical  Plan: Lipid panel reflex to direct LDL Fasting,         Glucose      (J45.20) Asthma, mild intermittent, well-controlled  Comment: ACT is 20.  Needs better allergy control- was only taking singulair prn.  Will add daily.  Add flonase if needed.    Plan: montelukast (SINGULAIR) 10 MG tablet,         fluticasone (FLOVENT HFA) 220 MCG/ACT inhaler,         albuterol (VENTOLIN HFA) 108 (90 Base) MCG/ACT         inhaler          (Z12.4) Cervical cancer screening  Comment: has never been sexually active- shared decision making- would like to decline and postpone    Patient has been advised of split billing requirements and indicates understanding: " Yes      COUNSELING:  Reviewed preventive health counseling, as reflected in patient instructions        She reports that she has never smoked. She has never used smokeless tobacco.          Maria Eugenia Wagner NP  [facility]

## 2023-09-27 ENCOUNTER — TELEPHONE (OUTPATIENT)
Dept: FAMILY MEDICINE | Facility: CLINIC | Age: 23
End: 2023-09-27
Payer: COMMERCIAL

## 2023-09-27 DIAGNOSIS — J45.40 MODERATE PERSISTENT ASTHMA, UNCOMPLICATED: ICD-10-CM

## 2023-09-28 NOTE — TELEPHONE ENCOUNTER
"Attempt #2    RN has attempted to contact this patient by phone to return their call, but there is no response.  Left message to \"call clinic at earliest convenience\".  Will try again later.     RAFAEL WallaceN, RN     "

## 2023-10-02 NOTE — TELEPHONE ENCOUNTER
RN Triage    Patient Contact    Attempt # 3    Was call answered?  No.  Left message on voicemail with information to call me back.    Jodi Lai RN on 10/2/2023 at 3:27 PM

## 2023-10-05 RX ORDER — PREDNISONE 20 MG/1
20 TABLET ORAL 2 TIMES DAILY
Qty: 10 TABLET | Refills: 3 | Status: CANCELLED | OUTPATIENT
Start: 2023-10-05

## 2023-10-05 NOTE — TELEPHONE ENCOUNTER
"Mom, Lizzette (c2c on file), called as patient is busy during the day and hasn't been able to return call.    Mom reported that Prednisone has been used as part of her asthma action plan and she hasn't needed to use it until this last summer with the horrible air quality. Patient is now out and needs more for her asthma.     Last office visit was on 8/7/23 and this was the note regarding asthma, \"(J45.20) Asthma, mild intermittent, well-controlled  Comment: ACT is 20.  Needs better allergy control- was only taking singulair prn.  Will add daily.  Add flonase if needed.    Plan: montelukast (SINGULAIR) 10 MG tablet,         fluticasone (FLOVENT HFA) 220 MCG/ACT inhaler,         albuterol (VENTOLIN HFA) 108 (90 Base) MCG/ACT         inhaler\"  " Removed intact

## 2023-10-06 RX ORDER — PREDNISONE 20 MG/1
40 TABLET ORAL DAILY
Qty: 10 TABLET | Refills: 0 | Status: SHIPPED | OUTPATIENT
Start: 2023-10-06 | End: 2023-10-11

## 2023-10-06 NOTE — TELEPHONE ENCOUNTER
I usually require at least an e-visit for prednisone.  I did send in a script- she should message me whenever she starts it to see if we need follow up.  Maria Eugenia Wagner, CNP

## 2023-10-09 NOTE — TELEPHONE ENCOUNTER
RN Triage    Patient Contact    Attempt # 1    Was call answered?  No.  Left message on voicemail with information to call me back.    Jodi Lai RN on 10/9/2023 at 9:56 AM

## 2024-11-05 ENCOUNTER — TELEPHONE (OUTPATIENT)
Dept: FAMILY MEDICINE | Facility: CLINIC | Age: 24
End: 2024-11-05
Payer: COMMERCIAL

## 2024-11-05 DIAGNOSIS — U07.1 INFECTION DUE TO 2019 NOVEL CORONAVIRUS: Primary | ICD-10-CM

## 2024-11-05 NOTE — TELEPHONE ENCOUNTER
RN COVID TREATMENT VISIT  11/05/24      The patient has been triaged and does not require a higher level of care.    Vibha Caceres  24 year old  Current weight? 135    Has the patient been seen by a primary care provider at an [facility] or [facility] within the past two years? Yes.   Have you been in close proximity to/do you have a known exposure to a person with a confirmed case of influenza? No.     General treatment eligibility:  Date of positive COVID test (PCR or at home)?  11/5/24 symptoms started 11/3    Are you or have you been hospitalized for this COVID-19 infection? No.   Have you received monoclonal antibodies or antiviral treatment for COVID-19 since this positive test? No.   Do you have any of the following conditions that place you at risk of being very sick from COVID-19?   - Chronic lung diseases such as asthma, bronchiectasis, COPD, interstitial lung disease, pulmonary embolism, pulmonary hypertension   Yes, patient has at least one high risk condition as noted above.     Current COVID symptoms:   - cough  - shortness of breath or difficulty breathing  - sore throat  - congestion or runny nose  Yes. Patient has at least one symptom as selected.     How many days since symptoms started? 5 days or less. Established patient, 12 years or older weighing at least 88.2 lbs, who has symptoms that started in the past 5 days, has not been hospitalized nor received treatment already, and is at risk for being very sick from COVID-19.     Treatment eligibility by RN:  Are you currently pregnant or nursing? No  Do you have a clinically significant hypersensitivity to nirmatrelvir or ritonavir, or toxic epidermal necrolysis (TEN) or Pulido-Burton Syndrome? No  Do you have a history of hepatitis, any hepatic impairment on the Problem List (such as Child-Garcia Class C, cirrhosis, fatty liver disease, alcoholic liver disease), or was the last liver lab (hepatic panel, ALT, AST, ALK  Phos, bilirubin) elevated in the past 6 months? No  Do you have any history of severe renal impairment (eGFR < 30mL/min)? No    Is patient eligible to continue? Yes, patient meets all eligibility requirements for the RN COVID treatment (as denoted by all no responses above).     Current Outpatient Medications   Medication Sig Dispense Refill    albuterol (VENTOLIN HFA) 108 (90 Base) MCG/ACT inhaler INHALE 2 PUFFS INTO THE LUNGS EVERY 6 HOURS AS NEEDED 36 g 4    Calcium Carb-Cholecalciferol 500-400 MG-UNIT TABS Take 2 at bedtime      cetirizine (ZYRTEC) 10 MG tablet Take 10 mg by mouth daily      fluticasone (FLOVENT HFA) 220 MCG/ACT inhaler Inhale 2 puffs into the lungs 2 times daily 12 g 3    montelukast (SINGULAIR) 10 MG tablet TAKE ONE TABLET BY MOUTH AT BEDTIME 90 tablet 3    triamcinolone (KENALOG) 0.1 % external cream Apply sparingly to affected area three times daily as needed 80 g 0       Medications from List 1 of the standing order (on medications that exclude the use of Paxlovid) that patient is taking: NONE. Is patient taking Mary Alice's Wort? No  Is patient taking Mary Alice's Wort or any meds from List 1? No.   Medications from List 2 of the standing order (on meds that provider needs to adjust) that patient is taking: NONE. Is patient on any of the meds from List 2? No.   Medications from List 3 of standing order (on meds that a RN needs to adjust) that patient is taking: NONE. Is patient on any meds from List 3? No.     Paxlovid has an approximate 90% reduction in hospitalization. Paxlovid can possibly cause altered sense of taste, diarrhea (loose, watery stools), high blood pressure, muscle aches.     Would patient like a Paxlovid prescription?   Yes.   Lab Results   Component Value Date    GFRESTIMATED GFR not calculated, patient <16 years old. 09/19/2007       Was last eGFR reduced? No, eGFR 60 or greater/ No Result on record. Patient can receive the normal renal function dose. Paxlovid Rx sent to  [facility]   [facility]   [phone]    [address] [place] [address]    Get Directions  Hours:  Mon-Fri: 8:00a - 8:30p  Sat: 9:00a - 5:00p  Sun: 9:00a - 4:00p    Temporary change to home medications: None    All medication adjustments (holds, etc) were discussed with the patient and patient was asked to repeat back (teachback) their med adjustment.  Did patient understand med adjustment? No medication adjustments needed.         Reviewed the following instructions with the patient:    Paxlovid (nimatrelvir and ritonavir)    How it works  Two medicines (nirmatrelvir and ritonavir) are taken together. They stop the virus from growing. Less amount of virus is easier for your body to fight.    How to take  Medicine comes in a daily container with both medicine tablets. Take by mouth twice daily (once in the morning, once at night) for 5 days.  The number of tablets to take varies by patient.  Don't chew or break capsules. Swallow whole.    When to take  Take as soon as possible after positive COVID-19 test result, and within 5 days of your first symptoms.    Possible side effects  Can cause altered sense of taste, diarrhea (loose, watery stools), high blood pressure, muscle aches.    Jodi Lai RN

## 2024-11-09 ENCOUNTER — HEALTH MAINTENANCE LETTER (OUTPATIENT)
Age: 24
End: 2024-11-09

## 2025-01-08 ENCOUNTER — NURSE TRIAGE (OUTPATIENT)
Dept: FAMILY MEDICINE | Facility: CLINIC | Age: 25
End: 2025-01-08
Payer: COMMERCIAL

## 2025-01-08 DIAGNOSIS — J45.40 MODERATE PERSISTENT ASTHMA, UNCOMPLICATED: Primary | ICD-10-CM

## 2025-01-08 NOTE — TELEPHONE ENCOUNTER
Provider Response to 2nd Level Triage Request    I have reviewed the RN documentation. My recommendation is:  Follow up as intended tomorrow. Will place order for respiratory panel to do prior to appt. Candidate for tamiflu if positive.

## 2025-01-08 NOTE — TELEPHONE ENCOUNTER
Nurse Triage SBAR    Is this a 2nd Level Triage? 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 History of asthma and started taking her Singulair and has inhalers.     Assessment: See assessment info below. Reporting some shortness of breath with activity, feels okay at rest. Otherwise denies any red flag symptoms.     Protocol Recommended Disposition:   Go To Office Now    Recommendation: No appointments available for today. Advised [facility]/ED, but patient prefers appointment in clinic tomorrow after her work day.     Routing to provider for review: okay to wait for appointment as scheduled for tomorrow.     RN reviewed red flag symptoms with patient and when to see emergency care. They agreed and understood.        Routed to provider    Does the patient meet one of the following criteria for ADS visit consideration? 16+ years old, with an [facility] PCP     TIP  Providers, please consider if this condition is appropriate for management at one of our Acute and Diagnostic Services sites.     If patient is a good candidate, please use dotphrase <dot>triageresponse and select Refer to ADS to document.    Reason for Disposition   MILD difficulty breathing (e.g., minimal/no SOB at rest, SOB with walking, pulse < 100) of new-onset or worse than normal   Patient wants to be seen    Additional Information   Negative: SEVERE difficulty breathing (e.g., struggling for each breath, speaks in single words, pulse > 120)   Negative: Breathing stopped and hasn't returned   Negative: Choking on something   Negative: Bluish (or gray) lips or face   Negative: Difficult to awaken or acting confused (e.g., disoriented, slurred speech)   Negative: Passed out (e.g., fainted, lost consciousness, blacked out and was not responding)   Negative: Wheezing started  "suddenly after medicine, an allergic food, or bee sting   Negative: Stridor (harsh sound while breathing in)   Negative: Slow, shallow and weak breathing   Negative: Sounds like a life-threatening emergency to the triager   Negative: Chest pain   Negative: Wheezing (high pitched whistling sound) and previous asthma attacks or use of asthma medicines   Negative: Breathing difficulty and within 14 days of COVID-19 EXPOSURE (close contact) with someone diagnosed with COVID-19 (e.g., COVID test positive)   Negative: Breathing difficulty and COVID-19 is widespread in the community   Negative: Breathing diffculty and only present when coughing   Negative: Breathing difficulty and only from stuffy nose   Negative: Breathing diffculty and only from stuffy nose or runny nose from common cold   Negative: MODERATE difficulty breathing (e.g., speaks in phrases, SOB even at rest, pulse 100-120) of new-onset or worse than normal   Negative: Oxygen level (e.g., pulse oximetry) 90% or lower   Negative: Wheezing can be heard across the room   Negative: Drooling or spitting out saliva (because can't swallow)   Negative: Any history of prior \"blood clot\" in leg or lungs   Negative: Illness requiring prolonged bedrest in past month (e.g., immobilization, long hospital stay)   Negative: Hip or leg fracture (broken bone) in past month (or had cast on leg or ankle in past month)   Negative: Major surgery in the past month   Negative: Long-distance travel in past month (e.g., car, bus, train, plane; with trip lasting 6 or more hours)   Negative: Cancer treatment in past six months (or has cancer now)   Negative: Extra heartbeats, irregular heart beating, or heart is beating very fast (i.e., 'palpitations')   Negative: Fever > 103 F (39.4 C)   Negative: Fever > 101 F (38.3 C) and over 60 years of age   Negative: Fever > 100 F (37.8 C) and bedridden (e.g., nursing home patient, stroke, chronic illness, recovering from surgery)   Negative: " "Fever > 100 F (37.8 C) and diabetes mellitus or weak immune system (e.g., HIV positive, cancer chemo, splenectomy, organ transplant, chronic steroids)   Negative: Periods where breathing stops and then resumes normally and bedridden (e.g., nursing home patient, CVA)   Negative: Pregnant or postpartum (from 0 to 6 weeks after delivery)   Negative: Patient sounds very sick or weak to the triager    Answer Assessment - Initial Assessment Questions  1. RESPIRATORY STATUS: \"Describe your breathing?\" (e.g., wheezing, shortness of breath, unable to speak, severe coughing)       Shortness of breath, unable to take a deep breath in  Coughing; body aches  Shortness of breath with increased activity; walking up stairs, etc.   Improved after taking Flovent    2. ONSET: \"When did this breathing problem begin?\"       Today    3. PATTERN \"Does the difficult breathing come and go, or has it been constant since it started?\"       Intermittent, with activity    4. SEVERITY: \"How bad is your breathing?\" (e.g., mild, moderate, severe)       Okay at rest; moderate with activity    5. RECURRENT SYMPTOM: \"Have you had difficulty breathing before?\" If Yes, ask: \"When was the last time?\" and \"What happened that time?\"       Yes, asthmatic  Had Covid in November    6. CARDIAC HISTORY: \"Do you have any history of heart disease?\" (e.g., heart attack, angina, bypass surgery, angioplasty)       No    7. LUNG HISTORY: \"Do you have any history of lung disease?\"  (e.g., pulmonary embolus, asthma, emphysema)      Asthma     8. CAUSE: \"What do you think is causing the breathing problem?\"       Illness; influenza exposure    9. OTHER SYMPTOMS: \"Do you have any other symptoms?\" (e.g., chest pain, cough, dizziness, fever, runny nose)      Coughing, fatigue, body aches, congestion     10. O2 SATURATION MONITOR:  \"Do you use an oxygen saturation monitor (pulse oximeter) at home?\" If Yes, ask: \"What is your reading (oxygen level) today?\" \"What is your " "usual oxygen saturation reading?\" (e.g., 95%)        No    11. PREGNANCY: \"Is there any chance you are pregnant?\" \"When was your last menstrual period?\"        No  12. TRAVEL: \"Have you traveled out of the country in the last month?\" (e.g., travel history, exposures)        no    Protocols used: Breathing Difficulty-A-[place]    "

## 2025-01-09 ENCOUNTER — OFFICE VISIT (OUTPATIENT)
Dept: FAMILY MEDICINE | Facility: CLINIC | Age: 25
End: 2025-01-09
Payer: COMMERCIAL

## 2025-01-09 VITALS
WEIGHT: 144.2 LBS | RESPIRATION RATE: 18 BRPM | HEART RATE: 93 BPM | TEMPERATURE: 97.7 F | OXYGEN SATURATION: 96 % | SYSTOLIC BLOOD PRESSURE: 112 MMHG | BODY MASS INDEX: 24.62 KG/M2 | HEIGHT: 64 IN | DIASTOLIC BLOOD PRESSURE: 80 MMHG

## 2025-01-09 DIAGNOSIS — J45.20 ASTHMA, MILD INTERMITTENT, WELL-CONTROLLED: ICD-10-CM

## 2025-01-09 DIAGNOSIS — J06.9 VIRAL URI WITH COUGH: Primary | ICD-10-CM

## 2025-01-09 RX ORDER — METHYLPREDNISOLONE 4 MG/1
TABLET ORAL
Qty: 21 TABLET | Refills: 0 | Status: SHIPPED | OUTPATIENT
Start: 2025-01-09

## 2025-01-09 RX ORDER — FLUTICASONE PROPIONATE 220 UG/1
2 AEROSOL, METERED RESPIRATORY (INHALATION) 2 TIMES DAILY
Qty: 12 G | Refills: 3 | Status: SHIPPED | OUTPATIENT
Start: 2025-01-09

## 2025-01-09 ASSESSMENT — ASTHMA QUESTIONNAIRES
QUESTION_5 LAST FOUR WEEKS HOW WOULD YOU RATE YOUR ASTHMA CONTROL: SOMEWHAT CONTROLLED
QUESTION_2 LAST FOUR WEEKS HOW OFTEN HAVE YOU HAD SHORTNESS OF BREATH: ONCE OR TWICE A WEEK
QUESTION_4 LAST FOUR WEEKS HOW OFTEN HAVE YOU USED YOUR RESCUE INHALER OR NEBULIZER MEDICATION (SUCH AS ALBUTEROL): ONCE A WEEK OR LESS
ACT_TOTALSCORE: 20
QUESTION_3 LAST FOUR WEEKS HOW OFTEN DID YOUR ASTHMA SYMPTOMS (WHEEZING, COUGHING, SHORTNESS OF BREATH, CHEST TIGHTNESS OR PAIN) WAKE YOU UP AT NIGHT OR EARLIER THAN USUAL IN THE MORNING: ONCE OR TWICE
ACT_TOTALSCORE: 20
QUESTION_1 LAST FOUR WEEKS HOW MUCH OF THE TIME DID YOUR ASTHMA KEEP YOU FROM GETTING AS MUCH DONE AT WORK, SCHOOL OR AT HOME: NONE OF THE TIME

## 2025-01-09 ASSESSMENT — ENCOUNTER SYMPTOMS
COUGH: 1
SHORTNESS OF BREATH: 1

## 2025-01-09 ASSESSMENT — PAIN SCALES - GENERAL: PAINLEVEL_OUTOF10: NO PAIN (0)

## 2025-01-09 NOTE — PROGRESS NOTES
Assessment & Plan     Viral URI with cough    Asthma, mild intermittent, well-controlled  - fluticasone (FLOVENT HFA) 220 MCG/ACT inhaler; Inhale 2 puffs into the lungs 2 times daily.  - methylPREDNISolone (MEDROL DOSEPAK) 4 MG tablet therapy pack; Follow Package Directions    Suspect likely influenza given recent exposure. Discussed options for viral testing today however likely will not change course of treatment so Vibha elected to wait with this today. Given history of asthma and chest tightness, will start oral Medrol Dose Steven for respiratory symptoms. Encourgaed to continue with Flovent Inhaler and Singulair. Use albuterol as needed. Supportive cares are encouraged, increased fluid intake and rest.     OTC medications for symptom relief as below.   Dextromethorphan (Delsym) 10-20mg every 4 hours OR 60mg twice daily as needed for cough  Guaifenesin (Mucinex) 1,000mg Twice daily as needed for mucus/congestion  Fluticasone (Flonase) 2 puffs in each nostril once daily for congestion   Pseudoephedrine (Sudafed)  120mg every 12 hours as needed for congestion   Saline nasal spray or irrigation twice daily (Simply Saline)   Tylenol (1,000mg every 6-8 hours) or Ibuprofen (600mg every 6-8 hours) as needed for fever or pain     Follow up if symptoms fail to improve or worsen, or if shortness of breath or chest pain develop.     I explained my diagnostic considerations and recommendations to the patient, who voiced understanding and agreement with the assessment and treatment plan. All questions were answered to patient's apparent satisfaction. We discussed potential side effects of any prescribed or recommended therapies, as well as expectations for response to treatments and importance of lifestyle measures that may improve symptoms. Patient was advised to contact our office if there are new symptoms or no improvement or worsening of conditions or symptoms.    The longitudinal plan of care for the  diagnosis(es)/condition(s) as documented were addressed during this visit. Due to the added complexity in care, I will continue to support iVbha in the subsequent management and with ongoing continuity of care.        Subjective   Vibha is a 24 year old, presenting for the following health issues:  Asthma, Shortness of Breath, and Cough (Covid back in November and hasn't felt the same since. Exposure to Influenza at school, body aches, chest pain with coughing. )      1/9/2025     3:41 PM   Additional Questions   Roomed by Sujata CARTAGENA LPN     History of Present Illness       Reason for visit:  Problems breathing abd coughing   She is taking medications regularly.     Vibha presents to clinic today with concerns of cough and some shortness of breath over hte past 2-3 days. She has had recent exposure to Influenza A, she works as a . She reports persistent cough since her symptoms started, which is now more painful when coughing. She also notes becoming more winded when going up stairs or exerting herself more. She did also note some increased fatigue and body aches yesterday. She has not had a fever, she denies any sore throat, congestion or ear pain. She has a history of underlying asthma. She has started using her Flovent and Singulair since her symptoms started. She does have an albuterol inhaler available but has not yet needed this.     Patient Active Problem List   Diagnosis    Asthma, mild intermittent, well-controlled    Scoliosis    Seasonal allergic rhinitis     Current Outpatient Medications   Medication Sig Dispense Refill    albuterol (VENTOLIN HFA) 108 (90 Base) MCG/ACT inhaler INHALE 2 PUFFS INTO THE LUNGS EVERY 6 HOURS AS NEEDED 36 g 4    Calcium Carb-Cholecalciferol 500-400 MG-UNIT TABS Take 2 at bedtime      fluticasone (FLOVENT HFA) 220 MCG/ACT inhaler Inhale 2 puffs into the lungs 2 times daily. 12 g 3    methylPREDNISolone (MEDROL DOSEPAK) 4 MG tablet therapy pack Follow  "Package Directions 21 tablet 0    montelukast (SINGULAIR) 10 MG tablet TAKE ONE TABLET BY MOUTH AT BEDTIME 90 tablet 3    triamcinolone (KENALOG) 0.1 % external cream Apply sparingly to affected area three times daily as needed 80 g 0    cetirizine (ZYRTEC) 10 MG tablet Take 10 mg by mouth daily (Patient not taking: Reported on 1/9/2025)       No current facility-administered medications for this visit.       Review of Systems  Constitutional, HEENT, cardiovascular, pulmonary, gi and gu systems are negative, except as otherwise noted.      Objective    /80   Pulse 93   Temp 97.7  F (36.5  C) (Temporal)   Resp 18   Ht 1.632 m (5' 4.25\")   Wt 65.4 kg (144 lb 3.2 oz)   LMP 12/20/2024 (Approximate)   SpO2 96%   BMI 24.56 kg/m    Body mass index is 24.56 kg/m .  Physical Exam  Vitals reviewed.   Constitutional:       General: She is not in acute distress.     Appearance: Normal appearance.   Eyes:      Extraocular Movements: Extraocular movements intact.      Conjunctiva/sclera: Conjunctivae normal.   Cardiovascular:      Rate and Rhythm: Normal rate and regular rhythm.      Heart sounds: Normal heart sounds.   Pulmonary:      Effort: Pulmonary effort is normal.      Breath sounds: Normal breath sounds.      Comments: Tightness to bilateral upper and lower lungs, no specific wheeze or crackles   Musculoskeletal:      Cervical back: Neck supple.   Lymphadenopathy:      Cervical: No cervical adenopathy.   Skin:     General: Skin is warm and dry.   Neurological:      Mental Status: She is alert and oriented to person, place, and time. Mental status is at baseline.   Psychiatric:         Mood and Affect: Mood normal.         Behavior: Behavior normal.                  Signed Electronically by: Macy Rausch NP    "

## 2025-01-14 ENCOUNTER — MYC REFILL (OUTPATIENT)
Dept: FAMILY MEDICINE | Facility: CLINIC | Age: 25
End: 2025-01-14
Payer: COMMERCIAL

## 2025-01-14 DIAGNOSIS — J45.20 ASTHMA, MILD INTERMITTENT, WELL-CONTROLLED: ICD-10-CM

## 2025-01-15 ENCOUNTER — OFFICE VISIT (OUTPATIENT)
Dept: FAMILY MEDICINE | Facility: CLINIC | Age: 25
End: 2025-01-15
Payer: COMMERCIAL

## 2025-01-15 ENCOUNTER — NURSE TRIAGE (OUTPATIENT)
Dept: FAMILY MEDICINE | Facility: CLINIC | Age: 25
End: 2025-01-15

## 2025-01-15 ENCOUNTER — HOSPITAL ENCOUNTER (OUTPATIENT)
Dept: GENERAL RADIOLOGY | Facility: CLINIC | Age: 25
Discharge: HOME | End: 2025-01-15
Attending: FAMILY MEDICINE
Payer: COMMERCIAL

## 2025-01-15 VITALS
HEART RATE: 99 BPM | TEMPERATURE: 97.7 F | HEIGHT: 64 IN | WEIGHT: 139.4 LBS | SYSTOLIC BLOOD PRESSURE: 114 MMHG | DIASTOLIC BLOOD PRESSURE: 82 MMHG | OXYGEN SATURATION: 94 % | RESPIRATION RATE: 18 BRPM | BODY MASS INDEX: 23.8 KG/M2

## 2025-01-15 DIAGNOSIS — J45.41 MODERATE PERSISTENT ASTHMA WITH ACUTE EXACERBATION: ICD-10-CM

## 2025-01-15 DIAGNOSIS — J18.9 PNEUMONIA OF LOWER LOBE DUE TO INFECTIOUS ORGANISM, UNSPECIFIED LATERALITY: ICD-10-CM

## 2025-01-15 DIAGNOSIS — J45.41 MODERATE PERSISTENT ASTHMA WITH ACUTE EXACERBATION: Primary | ICD-10-CM

## 2025-01-15 LAB
FLUAV RNA SPEC QL NAA+PROBE: POSITIVE
FLUBV RNA RESP QL NAA+PROBE: NEGATIVE
RSV RNA SPEC NAA+PROBE: NEGATIVE
SARS-COV-2 RNA RESP QL NAA+PROBE: NEGATIVE

## 2025-01-15 PROCEDURE — G2211 COMPLEX E/M VISIT ADD ON: HCPCS | Performed by: FAMILY MEDICINE

## 2025-01-15 PROCEDURE — 99214 OFFICE O/P EST MOD 30 MIN: CPT | Performed by: FAMILY MEDICINE

## 2025-01-15 PROCEDURE — 71046 X-RAY EXAM CHEST 2 VIEWS: CPT

## 2025-01-15 PROCEDURE — 87637 SARSCOV2&INF A&B&RSV AMP PRB: CPT | Performed by: FAMILY MEDICINE

## 2025-01-15 RX ORDER — PREDNISONE 20 MG/1
20 TABLET ORAL 2 TIMES DAILY
Qty: 10 TABLET | Refills: 0 | Status: SHIPPED | OUTPATIENT
Start: 2025-01-15

## 2025-01-15 RX ORDER — BENZONATATE 100 MG/1
100 CAPSULE ORAL 3 TIMES DAILY PRN
Qty: 60 CAPSULE | Refills: 0 | Status: SHIPPED | OUTPATIENT
Start: 2025-01-15

## 2025-01-15 RX ORDER — OSELTAMIVIR PHOSPHATE 75 MG/1
75 CAPSULE ORAL 2 TIMES DAILY
Qty: 10 CAPSULE | Refills: 0 | Status: SHIPPED | OUTPATIENT
Start: 2025-01-15 | End: 2025-01-20

## 2025-01-15 RX ORDER — AZITHROMYCIN 250 MG/1
TABLET, FILM COATED ORAL
Qty: 6 TABLET | Refills: 0 | Status: SHIPPED | OUTPATIENT
Start: 2025-01-15 | End: 2025-01-20

## 2025-01-15 RX ORDER — DEXTROMETHORPHAN HYDROBROMIDE AND PROMETHAZINE HYDROCHLORIDE 15; 6.25 MG/5ML; MG/5ML
5 SYRUP ORAL EVERY 4 HOURS PRN
Qty: 118 ML | Refills: 0 | Status: SHIPPED | OUTPATIENT
Start: 2025-01-15

## 2025-01-15 ASSESSMENT — PAIN SCALES - GENERAL: PAINLEVEL_OUTOF10: MODERATE PAIN (5)

## 2025-01-15 NOTE — TELEPHONE ENCOUNTER
Nurse Triage SBAR    Is this a 2nd Level Triage? 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  Pt reporting difficulty breathing.  Pt has had a cough ongoing since COVID in November.   Cough worse since around Christmastime.  Had been prescribed a steroid in pill form, which helped short term then symptoms came back and worsened (sounds like Medrol dose pack).   Pt reports last night stayed up all night coughing.  Mucus is a brown/light pink.  Hard to take a deep.  Wheezing ongoing since this morning.   COVID test returned negative around Christmastime.   Pt reports current O2 at 95% via pulse ox at home.  Felt feverish last week.     Assessment:   Respiratory symptoms ongoing/worsening, advising appt today    Protocol Recommended Disposition:   Go To Office Now    Recommendation:   Any appts available at [facility] today?     Routed to provider    Does the patient meet one of the following criteria for ADS visit consideration? No    Advised pt go to office now.   Pt declined offered appt at [facility], would like to be seen at [facility].   Also discussed option of local .  Routing 2nd level to RN team at [facility] to check for for availability.   Pt callback # [phone]     Advised patient to call back if symptoms worsen or new symptoms arise. Advised patient to present to emergency department in the case of medical emergency. Red flag symptoms reviewed with patient.     Jayda JIMÉNEZ RN          Reason for Disposition   MILD difficulty breathing (e.g., minimal/no SOB at rest, SOB with walking, pulse < 100) of new-onset or worse than normal   Longstanding difficulty breathing and not responding to usual therapy    Additional Information   Negative: Choking on something   Negative: Bluish (or gray) lips or face   Negative: SEVERE difficulty breathing (e.g., struggling for each breath, speaks  "in single words, pulse > 120)   Negative: Breathing stopped and hasn't returned   Negative: Difficult to awaken or acting confused (e.g., disoriented, slurred speech)   Negative: Passed out (e.g., fainted, lost consciousness, blacked out and was not responding)   Negative: Wheezing started suddenly after medicine, an allergic food, or bee sting   Negative: Stridor (harsh sound while breathing in)   Negative: Slow, shallow and weak breathing   Negative: Sounds like a life-threatening emergency to the triager   Negative: Chest pain   Negative: Wheezing (high pitched whistling sound) and previous asthma attacks or use of asthma medicines   Negative: Breathing difficulty and within 14 days of COVID-19 EXPOSURE (close contact) with someone diagnosed with COVID-19 (e.g., COVID test positive)   Negative: Breathing difficulty and COVID-19 is widespread in the community   Negative: Breathing diffculty and only present when coughing   Negative: Breathing difficulty and only from stuffy nose   Negative: Breathing diffculty and only from stuffy nose or runny nose from common cold   Negative: MODERATE difficulty breathing (e.g., speaks in phrases, SOB even at rest, pulse 100-120) of new-onset or worse than normal   Negative: Oxygen level (e.g., pulse oximetry) 90% or lower   Negative: Any history of prior \"blood clot\" in leg or lungs   Negative: Drooling or spitting out saliva (because can't swallow)   Negative: Wheezing can be heard across the room   Negative: Illness requiring prolonged bedrest in past month (e.g., immobilization, long hospital stay)   Negative: Hip or leg fracture (broken bone) in past month (or had cast on leg or ankle in past month)   Negative: Major surgery in the past month   Negative: Long-distance travel in past month (e.g., car, bus, train, plane; with trip lasting 6 or more hours)   Negative: Cancer treatment in past six months (or has cancer now)   Negative: Extra heartbeats, irregular heart beating, " or heart is beating very fast (i.e., 'palpitations')   Negative: Fever > 100 F (37.8 C) and bedridden (e.g., nursing home patient, stroke, chronic illness, recovering from surgery)   Negative: Fever > 100 F (37.8 C) and diabetes mellitus or weak immune system (e.g., HIV positive, cancer chemo, splenectomy, organ transplant, chronic steroids)   Negative: Fever > 103 F (39.4 C)   Negative: Fever > 101 F (38.3 C) and over 60 years of age   Negative: Periods where breathing stops and then resumes normally and bedridden (e.g., nursing home patient, CVA)   Negative: Pregnant or postpartum (from 0 to 6 weeks after delivery)   Negative: Patient sounds very sick or weak to the triager    Protocols used: Breathing Difficulty-A-OH

## 2025-01-15 NOTE — TELEPHONE ENCOUNTER
RN Triage    Patient Contact    Attempt # 1    Was call answered?  No.  Left message on voicemail with information to call me back.    Litzy Perales RN on 1/15/2025 at 10:44 AM

## 2025-01-15 NOTE — TELEPHONE ENCOUNTER
Provider Response to 2nd Level Triage Request    I have reviewed the RN documentation. My recommendation is:  Face To Face Visit. Same Day: place patient on my schedule, as I have availability.

## 2025-01-15 NOTE — TELEPHONE ENCOUNTER
Called and spoke with patient. Advised of providers response. Patient agrees with plan. Advised to arrive at 1:40pm. Patient verbalizes understanding and denies further questions at time of call. Appointment made.    Lnida Day RN on 1/15/2025 at 12:05 PM

## 2025-01-15 NOTE — PROGRESS NOTES
Assessment & Plan     (J45.41) Moderate persistent asthma with acute exacerbation  (primary encounter diagnosis)  Comment: Treat for exacerbation with azithro and prednisone. Given positive flu will also treat with tamiflu given risk factors despite being out of the window.   Plan: XR Chest 2 Views, azithromycin (ZITHROMAX) 250         MG tablet, predniSONE (DELTASONE) 20 MG tablet,        Influenza A/B, RSV and SARS-CoV2 PCR         (COVID-19), promethazine-DM (PHENERGAN-DM)         6.25-15 MG/5ML syrup, benzonatate (TESSALON)         100 MG capsule, oseltamivir (TAMIFLU) 75 MG         capsule    (J18.9) Pneumonia of lower lobe due to infectious organism, unspecified laterality  Comment: multifocal pna on exam c/w CXR strict ER precautions for worsening symptoms discussed.   Plan: XR Chest 2 Views, azithromycin (ZITHROMAX) 250         MG tablet, predniSONE (DELTASONE) 20 MG tablet,        promethazine-DM (PHENERGAN-DM) 6.25-15 MG/5ML         syrup, benzonatate (TESSALON) 100 MG capsule,         oseltamivir (TAMIFLU) 75 MG capsule              Follow-up if symptoms fail to improve or worsen.     Jaylen Dunne is a 24 year old, presenting for the following health issues:  Breathing Problem        1/15/2025     1:24 PM   Additional Questions   Roomed by Sujata CARTAGENA LPN     History of Present Illness       Reason for visit:  Problems breathing abd coughing   She is taking medications regularly.         Concern - Breathing/respiratory issues, Cough  Onset: beginning of January  Description: feels like an asthma attack, but only getting worse  Intensity: severe  Progression of Symptoms:  worsening, improved a little but is getting worse again  Accompanying Signs & Symptoms: coughing up mucus brownish/pink, wheezing, feels chest pressure and tightness, unable to get a good deep breath.   Previous history of similar problem: no  Precipitating factors:        Worsened by: cold, stairs  Alleviating factors:         "Improved by: prednisone  Therapies tried and outcome: zycam, cough medicine- does not help. Inhalers a few times this week     Patient is a 24-year-old female with a history of mild intermittent asthma generally well-controlled with waxing waning symptoms over the last few weeks of difficulties breathing only getting worse.  Was given a Medrol pack prior but has not responded well to this and actually feels like her symptoms are significantly worse of the last few days.  Positive flu testing today.  We did discuss Tamiflu.  As well Rales bilaterally on exam consistent with chest x-ray which showed multifocal pneumonia.  Patient was amenable to treatment for possible secondary infection with Zithromax as well as Tamiflu.        Objective    /82   Pulse 99   Temp 97.7  F (36.5  C) (Temporal)   Resp 18   Ht 1.626 m (5' 4\")   Wt 63.2 kg (139 lb 6.4 oz)   LMP 12/20/2024 (Approximate)   SpO2 94%   BMI 23.93 kg/m    Body mass index is 23.93 kg/m .  Physical Exam  Constitutional:       General: She is not in acute distress.     Appearance: She is ill-appearing. She is not toxic-appearing or diaphoretic.   HENT:      Right Ear: Tympanic membrane normal.      Left Ear: Tympanic membrane normal.      Nose: Congestion present.      Mouth/Throat:      Mouth: Mucous membranes are moist.      Pharynx: Posterior oropharyngeal erythema present. No oropharyngeal exudate.   Cardiovascular:      Rate and Rhythm: Normal rate and regular rhythm.      Pulses: Normal pulses.      Heart sounds: No murmur heard.  Pulmonary:      Effort: Pulmonary effort is normal. No respiratory distress.      Breath sounds: No stridor. Rales (bilateral lower lung fields) present. No wheezing or rhonchi.   Chest:      Chest wall: No tenderness.   Skin:     General: Skin is warm and dry.      Capillary Refill: Capillary refill takes less than 2 seconds.   Neurological:      Mental Status: She is alert. Mental status is at baseline.      "   Results for orders placed or performed during the hospital encounter of 01/15/25   XR Chest 2 Views     Status: None    Narrative    EXAM: XR CHEST 2 VIEWS  LOCATION: [facility]  DATE: 1/15/2025    INDICATION: Possible pneumonia.  COMPARISON: None.      Impression    IMPRESSION: Patchy opacity in the right lower lobe as well as the retrocardiac left lower lobe noted, concerning for multifocal pneumonia. May consider radiographic surveillance until resolution. Suspect trace pleural effusions. No pneumothorax. Moderate   scoliotic curvature of the thoracolumbar spine, apex left at the thoracolumbar junction, though only partially visible. Mild scoliotic curvature of the thoracic spine, apex right.   Results for orders placed or performed in visit on 01/15/25   Influenza A/B, RSV and SARS-CoV2 PCR (COVID-19) Nasopharyngeal     Status: Abnormal    Specimen: Nasopharyngeal; Swab   Result Value Ref Range    Influenza A PCR Positive (A) Negative    Influenza B PCR Negative Negative    RSV PCR Negative Negative    SARS CoV2 PCR Negative Negative    Narrative    Testing was performed using the Xpert Xpress CoV2/Flu/RSV Assay on the Cepheid GeneXpert Instrument. This test should be ordered for the detection of SARS-CoV2, influenza, and RSV viruses in individuals with signs and symptoms of respiratory tract infection. This test is for in vitro diagnostic use under the [place] FDA for laboratories certified under CLIA to perform high or moderate complexity testing. This test has been [place] FDA cleared. A negative result does not rule out the presence of PCR inhibitors in the specimen or target RNA in concentration below the limit of detection for the assay. If only one viral target is positive but coinfection with multiple targets is suspected, the sample should be re-tested with another FDA cleared, approved, or authorized test, if coninfection would change clinical management. This test was validated  by the [facility]. These laboratories are certified under the Clinical Laboratory Improvement Amendments of 1988 (CLIA-88) as qualified to perfom high complexity laboratory testing.             Signed Electronically by: Karma Johnson MD

## 2025-01-16 RX ORDER — METHYLPREDNISOLONE 4 MG/1
TABLET ORAL
Qty: 21 TABLET | Refills: 0 | OUTPATIENT
Start: 2025-01-16

## 2025-01-16 NOTE — TELEPHONE ENCOUNTER
Patient was triaged and seen in clinic yesterday 1/15/25, and the medication being requested was discontinued by the provider. Refusing refill request. MyChart sent to patient. Closing encounter.     Odalys Murcia, RAFAELN, RN

## 2025-02-26 ENCOUNTER — TELEPHONE (OUTPATIENT)
Dept: FAMILY MEDICINE | Facility: CLINIC | Age: 25
End: 2025-02-26
Payer: COMMERCIAL

## 2025-03-11 ENCOUNTER — VIRTUAL VISIT (OUTPATIENT)
Dept: INTERNAL MEDICINE | Facility: CLINIC | Age: 25
End: 2025-03-11
Payer: COMMERCIAL

## 2025-03-11 DIAGNOSIS — G47.00 PERSISTENT INSOMNIA: ICD-10-CM

## 2025-03-11 DIAGNOSIS — F41.9 ANXIETY: Primary | ICD-10-CM

## 2025-03-11 PROCEDURE — 98014 SYNCH AUDIO-ONLY EST MOD 30: CPT | Performed by: INTERNAL MEDICINE

## 2025-03-11 PROCEDURE — 1126F AMNT PAIN NOTED NONE PRSNT: CPT | Mod: 93 | Performed by: INTERNAL MEDICINE

## 2025-03-11 RX ORDER — HYDROXYZINE HYDROCHLORIDE 25 MG/1
25-50 TABLET, FILM COATED ORAL
Qty: 30 TABLET | Refills: 1 | Status: SHIPPED | OUTPATIENT
Start: 2025-03-11

## 2025-03-11 ASSESSMENT — ANXIETY QUESTIONNAIRES
8. IF YOU CHECKED OFF ANY PROBLEMS, HOW DIFFICULT HAVE THESE MADE IT FOR YOU TO DO YOUR WORK, TAKE CARE OF THINGS AT HOME, OR GET ALONG WITH OTHER PEOPLE?: SOMEWHAT DIFFICULT
GAD7 TOTAL SCORE: 9
4. TROUBLE RELAXING: SEVERAL DAYS
7. FEELING AFRAID AS IF SOMETHING AWFUL MIGHT HAPPEN: NOT AT ALL
5. BEING SO RESTLESS THAT IT IS HARD TO SIT STILL: NOT AT ALL
7. FEELING AFRAID AS IF SOMETHING AWFUL MIGHT HAPPEN: NOT AT ALL
6. BECOMING EASILY ANNOYED OR IRRITABLE: SEVERAL DAYS
GAD7 TOTAL SCORE: 9
GAD7 TOTAL SCORE: 9
IF YOU CHECKED OFF ANY PROBLEMS ON THIS QUESTIONNAIRE, HOW DIFFICULT HAVE THESE PROBLEMS MADE IT FOR YOU TO DO YOUR WORK, TAKE CARE OF THINGS AT HOME, OR GET ALONG WITH OTHER PEOPLE: SOMEWHAT DIFFICULT
2. NOT BEING ABLE TO STOP OR CONTROL WORRYING: NEARLY EVERY DAY
1. FEELING NERVOUS, ANXIOUS, OR ON EDGE: SEVERAL DAYS
3. WORRYING TOO MUCH ABOUT DIFFERENT THINGS: NEARLY EVERY DAY

## 2025-03-11 ASSESSMENT — ENCOUNTER SYMPTOMS: NERVOUS/ANXIOUS: 1

## 2025-03-11 NOTE — PROGRESS NOTES
Vibha is a 24 year old who is being evaluated via a billable telephone visit.    What phone number would you like to be contacted at? [phone]   How would you like to obtain your AVS? Jamal  Originating Location (pt. Location): Home    Distant Location (provider location):  On-site  Telephone visit completed due to the patient did not have access to video, while the distant provider did.    Assessment & Plan   Problem List Items Addressed This Visit    None  Visit Diagnoses       Anxiety    -  Primary    Relevant Medications    hydrOXYzine HCl (ATARAX) 25 MG tablet    FLUoxetine (PROZAC) 20 MG capsule    Persistent insomnia        Relevant Medications    hydrOXYzine HCl (ATARAX) 25 MG tablet             Patient who has anxiety is got worse.  She has some panic attacks and insomnia.  Previously had some hydroxyzine which worked for her.  We will start her on fluoxetine for a daily medication at 20 mg a day warned about possible side effects.  She can use hydroxyzine as needed to help her but I think the fluoxetine will take care of this over the next 4 to 6 weeks.  She can follow-up with myself or with primary in 6 to 8 weeks.  She has good support and is doing counseling.           Subjective   Vibha is a 24 year old, presenting for the following health issues:  Anxiety and Recheck Medication      3/11/2025     3:52 PM   Additional Questions   Roomed by Albert     Anxiety    History of Present Illness       Mental Health Follow-up:  Patient presents to follow-up on Anxiety.    Patient's anxiety since last visit has been:  Worse  The patient is having other symptoms associated with anxiety.  Any significant life events: job concerns and financial concerns  Patient is feeling anxious or having panic attacks.  Patient has no concerns about alcohol or drug use.    She eats 0-1 servings of fruits and vegetables daily.She consumes 0 sweetened beverage(s) daily.She exercises with enough effort to increase her  heart rate 30 to 60 minutes per day.  She exercises with enough effort to increase her heart rate 7 days per week.   She is taking medications regularly.        Normally sees Maria Eugenia Wagner,     Lives with parents in [place], mom is PT.  Single.  Works as a teacher, doing more school    [facility] 7 score of 9    Always been anxious, trouble at night, hard to stop thinking.   Seeing a counselor as well, affects her body and stomach pains. Some panic attacks at times.      Asthma is ok, flovent, singulair, and albuterol are as needed.     Had used hydroxyzine in the past to help sleep prn and would like some more.     Trying to walk daily to help mental health.     Not really drinking alcohol.          Objective    Vitals - Patient Reported  Pain Score: No Pain (0)    Physical Exam   General: Alert and no distress //Respiratory: No audible wheeze, cough, or shortness of breath // Psychiatric:  Appropriate affect, tone, and pace of words        Phone call duration: 12 minutes  Signed Electronically by: Ken Aguayo MD

## 2025-04-01 ENCOUNTER — TELEPHONE (OUTPATIENT)
Dept: FAMILY MEDICINE | Facility: CLINIC | Age: 25
End: 2025-04-01
Payer: COMMERCIAL

## 2025-04-01 NOTE — TELEPHONE ENCOUNTER
Patient Quality Outreach    Patient is due for the following:   Asthma  -  AAP  Physical       Topic Date Due    Pneumococcal Vaccine (1 of 2 - PCV) 06/17/2019    Flu Vaccine (1) 09/01/2024    COVID-19 Vaccine (6 - 2024-25 season) 09/01/2024       Action(s) Taken:   Patient has upcoming appointment, these items will be addressed at that time.    Type of outreach:    Chart review performed, no outreach needed.    Questions for provider review:    None         Monique Jacinto, VF  Chart routed to None.

## 2025-06-03 ENCOUNTER — PATIENT OUTREACH (OUTPATIENT)
Dept: CARE COORDINATION | Facility: CLINIC | Age: 25
End: 2025-06-03
Payer: COMMERCIAL

## 2025-06-30 ENCOUNTER — MYC MEDICAL ADVICE (OUTPATIENT)
Dept: FAMILY MEDICINE | Facility: CLINIC | Age: 25
End: 2025-06-30
Payer: COMMERCIAL

## 2025-07-02 ENCOUNTER — OFFICE VISIT (OUTPATIENT)
Dept: FAMILY MEDICINE | Facility: CLINIC | Age: 25
End: 2025-07-02
Payer: COMMERCIAL

## 2025-07-02 ENCOUNTER — TELEPHONE (OUTPATIENT)
Dept: FAMILY MEDICINE | Facility: CLINIC | Age: 25
End: 2025-07-02

## 2025-07-02 VITALS
TEMPERATURE: 98.8 F | RESPIRATION RATE: 12 BRPM | SYSTOLIC BLOOD PRESSURE: 110 MMHG | DIASTOLIC BLOOD PRESSURE: 70 MMHG | OXYGEN SATURATION: 97 % | HEIGHT: 65 IN | WEIGHT: 138 LBS | HEART RATE: 125 BPM | BODY MASS INDEX: 22.99 KG/M2

## 2025-07-02 DIAGNOSIS — L20.84 INTRINSIC ECZEMA: ICD-10-CM

## 2025-07-02 DIAGNOSIS — L73.9 FOLLICULITIS: Primary | ICD-10-CM

## 2025-07-02 PROCEDURE — 99213 OFFICE O/P EST LOW 20 MIN: CPT | Performed by: NURSE PRACTITIONER

## 2025-07-02 PROCEDURE — 3074F SYST BP LT 130 MM HG: CPT | Performed by: NURSE PRACTITIONER

## 2025-07-02 PROCEDURE — 3078F DIAST BP <80 MM HG: CPT | Performed by: NURSE PRACTITIONER

## 2025-07-02 RX ORDER — DOXYCYCLINE 100 MG/1
100 CAPSULE ORAL 2 TIMES DAILY
Qty: 20 CAPSULE | Refills: 0 | Status: SHIPPED | OUTPATIENT
Start: 2025-07-02 | End: 2025-07-12

## 2025-07-02 ASSESSMENT — ASTHMA QUESTIONNAIRES
QUESTION_1 LAST FOUR WEEKS HOW MUCH OF THE TIME DID YOUR ASTHMA KEEP YOU FROM GETTING AS MUCH DONE AT WORK, SCHOOL OR AT HOME: NONE OF THE TIME
QUESTION_4 LAST FOUR WEEKS HOW OFTEN HAVE YOU USED YOUR RESCUE INHALER OR NEBULIZER MEDICATION (SUCH AS ALBUTEROL): NOT AT ALL
QUESTION_2 LAST FOUR WEEKS HOW OFTEN HAVE YOU HAD SHORTNESS OF BREATH: NOT AT ALL
QUESTION_5 LAST FOUR WEEKS HOW WOULD YOU RATE YOUR ASTHMA CONTROL: COMPLETELY CONTROLLED
ACT_TOTALSCORE: 25
QUESTION_3 LAST FOUR WEEKS HOW OFTEN DID YOUR ASTHMA SYMPTOMS (WHEEZING, COUGHING, SHORTNESS OF BREATH, CHEST TIGHTNESS OR PAIN) WAKE YOU UP AT NIGHT OR EARLIER THAN USUAL IN THE MORNING: NOT AT ALL

## 2025-07-02 NOTE — PROGRESS NOTES
Assessment & Plan     Folliculitis    - doxycycline hyclate (VIBRAMYCIN) 100 MG capsule; Take 1 capsule (100 mg) by mouth 2 times daily for 10 days.    Vibha presents to clinic today with concerns of rash, slowly progressing over the past two weeks since being at a hotel prior to the start of her symptoms. She initially felt this was related to eczema, however appearance is not consistent with this. Appearance is most consistent with folliculitis. Discussed hot tub folliculitis, however she was not in a pool or hot tub recently. Will try course of doxycyline as she has amoxicillin and cephalosporin allergy. Continue use of hypoallergenic soaps, lotions and detergents. She will notify me in the coming days if her symptoms are not improving. Can consider punch biopsy of one of her lesions if needed.     I explained my diagnostic considerations and recommendations to the patient, who voiced understanding and agreement with the assessment and treatment plan. All questions were answered to patient's apparent satisfaction. We discussed potential side effects of any prescribed or recommended therapies, as well as expectations for response to treatments and importance of lifestyle measures that may improve symptoms. Patient was advised to contact our office if there are new symptoms or no improvement or worsening of conditions or symptoms.        Subjective   Vibha is a 25 year old, presenting for the following health issues:  Derm Problem (Trunk, breast, arm, spreading started 1 1/2 weeks ago)      7/2/2025    12:43 PM   Additional Questions   Roomed by Devon     History of Present Illness       Reason for visit:  Rash on my body  Symptom onset:  1-2 weeks ago  Symptoms include:  Red spots that are peeling  Symptom intensity:  Moderate  Symptom progression:  Worsening  Had these symptoms before:  No  What makes it worse:  No  What makes it better:  No She is missing 1 dose(s) of medications per week.      Vibha  presents to clinic today with concerns of rash. She reports staying at a hotel about two weeks ago and shortly thereafter started developing red spots. Initially on her legs, she has now developed spots on her abdomen, chest, back, arms and neck. She continues getting new spots daily. They are not itchy or painful. She initially thought this was related to her history of eczema and applied topical triamcinolone however this did not improve her symptoms and she continued to develop new spots. The spots do start to peel, they are not blistering or draining. She has otherwise felt well, no fever, chills, nausea, vomiting, chest pain or difficulty breathing.     Patient Active Problem List   Diagnosis    Asthma, mild intermittent, well-controlled    Scoliosis    Seasonal allergic rhinitis     Current Outpatient Medications   Medication Sig Dispense Refill    albuterol (VENTOLIN HFA) 108 (90 Base) MCG/ACT inhaler INHALE 2 PUFFS INTO THE LUNGS EVERY 6 HOURS AS NEEDED 36 g 4    Calcium Carb-Cholecalciferol 500-400 MG-UNIT TABS Take 2 at bedtime      doxycycline hyclate (VIBRAMYCIN) 100 MG capsule Take 1 capsule (100 mg) by mouth 2 times daily for 10 days. 20 capsule 0    FLUoxetine (PROZAC) 20 MG capsule Take 1 capsule (20 mg) by mouth daily. 90 capsule 3    fluticasone (FLOVENT HFA) 220 MCG/ACT inhaler Inhale 2 puffs into the lungs 2 times daily. 12 g 3    hydrOXYzine HCl (ATARAX) 25 MG tablet Take 1-2 tablets (25-50 mg) by mouth nightly as needed for anxiety (sleep). 30 tablet 1    montelukast (SINGULAIR) 10 MG tablet TAKE ONE TABLET BY MOUTH AT BEDTIME 90 tablet 3    triamcinolone (KENALOG) 0.1 % external cream Apply sparingly to affected area three times daily as needed (Patient not taking: Reported on 7/2/2025) 80 g 0     No current facility-administered medications for this visit.                 Review of Systems  Constitutional, HEENT, cardiovascular, pulmonary, gi and gu systems are negative, except as otherwise  "noted.      Objective    /70   Pulse (!) 125   Temp 98.8  F (37.1  C) (Temporal)   Resp 12   Ht 1.651 m (5' 5\")   Wt 62.6 kg (138 lb)   SpO2 97%   BMI 22.96 kg/m    Body mass index is 22.96 kg/m .  Physical Exam  Vitals reviewed.   Constitutional:       General: She is not in acute distress.     Appearance: Normal appearance.   Eyes:      Extraocular Movements: Extraocular movements intact.      Conjunctiva/sclera: Conjunctivae normal.   Pulmonary:      Effort: Pulmonary effort is normal.   Skin:     General: Skin is warm and dry.      Comments: Scattered raised, red papules in varying size, up to 4-5mm in diameter scattered across body. Most numerous across abdomen, chest and back.    Neurological:      Mental Status: She is alert and oriented to person, place, and time. Mental status is at baseline.   Psychiatric:         Mood and Affect: Mood normal.         Behavior: Behavior normal.                    Signed Electronically by: Macy Rausch NP    "

## 2025-07-02 NOTE — TELEPHONE ENCOUNTER
Left message for patient to call - we can see her TODAY at 1pm / 1240 arrival time, or TOMORROW at 9am/ 840am arrival time.     Macy Rausch has offered these appointment times, please transfer to a  if you are having difficulty scheduling.    Natalie Rose XRO/

## 2025-07-06 ENCOUNTER — MYC MEDICAL ADVICE (OUTPATIENT)
Dept: FAMILY MEDICINE | Facility: CLINIC | Age: 25
End: 2025-07-06
Payer: COMMERCIAL

## 2025-07-06 DIAGNOSIS — L20.84 INTRINSIC ECZEMA: Primary | ICD-10-CM

## 2025-07-07 RX ORDER — PREDNISONE 10 MG/1
20 TABLET ORAL DAILY
Qty: 10 TABLET | Refills: 0 | Status: SHIPPED | OUTPATIENT
Start: 2025-07-07 | End: 2025-07-12

## 2025-07-22 ENCOUNTER — MYC REFILL (OUTPATIENT)
Dept: FAMILY MEDICINE | Facility: CLINIC | Age: 25
End: 2025-07-22
Payer: COMMERCIAL

## 2025-07-22 DIAGNOSIS — J45.20 ASTHMA, MILD INTERMITTENT, WELL-CONTROLLED: ICD-10-CM

## 2025-07-22 RX ORDER — ALBUTEROL SULFATE 90 UG/1
INHALANT RESPIRATORY (INHALATION)
Qty: 18 G | Refills: 3 | Status: SHIPPED | OUTPATIENT
Start: 2025-07-22